# Patient Record
Sex: FEMALE | Race: WHITE | NOT HISPANIC OR LATINO | Employment: FULL TIME | URBAN - METROPOLITAN AREA
[De-identification: names, ages, dates, MRNs, and addresses within clinical notes are randomized per-mention and may not be internally consistent; named-entity substitution may affect disease eponyms.]

---

## 2019-12-11 ENCOUNTER — OFFICE VISIT (OUTPATIENT)
Dept: FAMILY MEDICINE CLINIC | Facility: CLINIC | Age: 34
End: 2019-12-11
Payer: COMMERCIAL

## 2019-12-11 ENCOUNTER — VBI (OUTPATIENT)
Dept: FAMILY MEDICINE CLINIC | Facility: CLINIC | Age: 34
End: 2019-12-11

## 2019-12-11 VITALS
RESPIRATION RATE: 14 BRPM | HEIGHT: 61 IN | WEIGHT: 192.6 LBS | DIASTOLIC BLOOD PRESSURE: 60 MMHG | TEMPERATURE: 97.6 F | BODY MASS INDEX: 36.36 KG/M2 | HEART RATE: 73 BPM | SYSTOLIC BLOOD PRESSURE: 104 MMHG | OXYGEN SATURATION: 100 %

## 2019-12-11 DIAGNOSIS — Z13.0 SCREENING FOR DEFICIENCY ANEMIA: ICD-10-CM

## 2019-12-11 DIAGNOSIS — Z13.1 SCREENING FOR DIABETES MELLITUS: ICD-10-CM

## 2019-12-11 DIAGNOSIS — Z13.220 SCREENING FOR LIPID DISORDERS: ICD-10-CM

## 2019-12-11 DIAGNOSIS — Z76.89 ENCOUNTER TO ESTABLISH CARE: ICD-10-CM

## 2019-12-11 DIAGNOSIS — Z13.29 SCREENING FOR THYROID DISORDER: ICD-10-CM

## 2019-12-11 DIAGNOSIS — L73.2 HIDRADENITIS SUPPURATIVA: Primary | ICD-10-CM

## 2019-12-11 DIAGNOSIS — Z00.00 PREVENTATIVE HEALTH CARE: ICD-10-CM

## 2019-12-11 PROCEDURE — 3008F BODY MASS INDEX DOCD: CPT | Performed by: NURSE PRACTITIONER

## 2019-12-11 PROCEDURE — 1036F TOBACCO NON-USER: CPT | Performed by: NURSE PRACTITIONER

## 2019-12-11 PROCEDURE — 99203 OFFICE O/P NEW LOW 30 MIN: CPT | Performed by: NURSE PRACTITIONER

## 2019-12-11 PROCEDURE — 36415 COLL VENOUS BLD VENIPUNCTURE: CPT | Performed by: NURSE PRACTITIONER

## 2019-12-11 RX ORDER — PRENATAL VIT 27,CALC/IRON/FA 60 MG-1 MG
1 TABLET ORAL DAILY
COMMUNITY
End: 2019-12-11 | Stop reason: ALTCHOICE

## 2019-12-11 RX ORDER — CLINDAMYCIN PHOSPHATE 11.9 MG/ML
SOLUTION TOPICAL
Refills: 2 | COMMUNITY
Start: 2019-11-06 | End: 2020-08-17 | Stop reason: SDUPTHER

## 2019-12-11 NOTE — ASSESSMENT & PLAN NOTE
Following up with dermatology and OB/GYN  Using clindamycin (CLEOCIN T) 1 % external solution, tolerating without issues

## 2019-12-11 NOTE — PROGRESS NOTES
Assessment/Plan:    1  Hidradenitis suppurativa  Assessment & Plan:  Following up with dermatology and OB/GYN  Using clindamycin (CLEOCIN T) 1 % external solution, tolerating without issues  2  Encounter to establish care    3  Preventative health care  -     CBC and differential  -     Comprehensive metabolic panel  -     TSH, 3rd generation  -     Lipid panel    4  Screening for deficiency anemia  -     CBC and differential    5  Screening for diabetes mellitus  -     Comprehensive metabolic panel    6  Screening for thyroid disorder  -     TSH, 3rd generation    7  Screening for lipid disorders  -     Lipid panel    Patient Instructions   Schedule annual physical      Return for Annual physical     Subjective:      Patient ID: Soto Chiu is a 29 y o  female  Chief Complaint   Patient presents with   Mauricio Blum is a 29year old female who presents to the office to establish care with primary provider  She is UTD PAP smear and Tdap vaccination  She has no acute complaints today  The following portions of the patient's history were reviewed and updated as appropriate: allergies, current medications, past family history, past medical history, past social history, past surgical history and problem list     Review of Systems   Respiratory: Negative for chest tightness and shortness of breath  Cardiovascular: Negative for chest pain and palpitations  Current Outpatient Medications   Medication Sig Dispense Refill    clindamycin (CLEOCIN T) 1 % external solution   2     No current facility-administered medications for this visit  Objective:    /60   Pulse 73   Temp 97 6 °F (36 4 °C) (Temporal)   Resp 14   Ht 5' 1" (1 549 m)   Wt 87 4 kg (192 lb 9 6 oz)   SpO2 100%   BMI 36 39 kg/m²        Physical Exam   Constitutional: She is oriented to person, place, and time  She appears well-developed and well-nourished  No distress     HENT:   Head: Normocephalic and atraumatic  Eyes: Conjunctivae are normal  Right eye exhibits no discharge  Left eye exhibits no discharge  Neck: Normal range of motion  Neck supple  No thyromegaly present  Cardiovascular: Normal rate, regular rhythm and normal heart sounds  Pulmonary/Chest: Effort normal and breath sounds normal  No respiratory distress  She has no decreased breath sounds  She has no wheezes  She has no rhonchi  She has no rales  Lymphadenopathy:     She has no cervical adenopathy  Neurological: She is alert and oriented to person, place, and time  Skin: Skin is warm and dry  No rash noted  She is not diaphoretic  Psychiatric: She has a normal mood and affect   Her behavior is normal  Judgment and thought content normal          MAYDA Grider

## 2019-12-12 LAB
ALBUMIN SERPL-MCNC: 4.4 G/DL (ref 3.5–5.5)
ALBUMIN/GLOB SERPL: 1.4 {RATIO} (ref 1.2–2.2)
ALP SERPL-CCNC: 90 IU/L (ref 39–117)
ALT SERPL-CCNC: 15 IU/L (ref 0–32)
AST SERPL-CCNC: 16 IU/L (ref 0–40)
BASOPHILS # BLD AUTO: 0 X10E3/UL (ref 0–0.2)
BASOPHILS NFR BLD AUTO: 1 %
BILIRUB SERPL-MCNC: <0.2 MG/DL (ref 0–1.2)
BUN SERPL-MCNC: 10 MG/DL (ref 6–20)
BUN/CREAT SERPL: 18 (ref 9–23)
CALCIUM SERPL-MCNC: 9.3 MG/DL (ref 8.7–10.2)
CHLORIDE SERPL-SCNC: 102 MMOL/L (ref 96–106)
CHOLEST SERPL-MCNC: 131 MG/DL (ref 100–199)
CHOLEST/HDLC SERPL: 4.2 RATIO (ref 0–4.4)
CO2 SERPL-SCNC: 23 MMOL/L (ref 20–29)
CREAT SERPL-MCNC: 0.56 MG/DL (ref 0.57–1)
EOSINOPHIL # BLD AUTO: 0.1 X10E3/UL (ref 0–0.4)
EOSINOPHIL NFR BLD AUTO: 2 %
ERYTHROCYTE [DISTWIDTH] IN BLOOD BY AUTOMATED COUNT: 13.8 % (ref 12.3–15.4)
GLOBULIN SER-MCNC: 3.1 G/DL (ref 1.5–4.5)
GLUCOSE SERPL-MCNC: 93 MG/DL (ref 65–99)
HCT VFR BLD AUTO: 35.7 % (ref 34–46.6)
HDLC SERPL-MCNC: 31 MG/DL
HGB BLD-MCNC: 12.1 G/DL (ref 11.1–15.9)
IMM GRANULOCYTES # BLD: 0 X10E3/UL (ref 0–0.1)
IMM GRANULOCYTES NFR BLD: 0 %
LDLC SERPL CALC-MCNC: 62 MG/DL (ref 0–99)
LYMPHOCYTES # BLD AUTO: 2.5 X10E3/UL (ref 0.7–3.1)
LYMPHOCYTES NFR BLD AUTO: 39 %
MCH RBC QN AUTO: 29 PG (ref 26.6–33)
MCHC RBC AUTO-ENTMCNC: 33.9 G/DL (ref 31.5–35.7)
MCV RBC AUTO: 86 FL (ref 79–97)
MONOCYTES # BLD AUTO: 0.4 X10E3/UL (ref 0.1–0.9)
MONOCYTES NFR BLD AUTO: 6 %
MORPHOLOGY BLD-IMP: NORMAL
NEUTROPHILS # BLD AUTO: 3.4 X10E3/UL (ref 1.4–7)
NEUTROPHILS NFR BLD AUTO: 52 %
PLATELET # BLD AUTO: 331 X10E3/UL (ref 150–450)
POTASSIUM SERPL-SCNC: 4.2 MMOL/L (ref 3.5–5.2)
PROT SERPL-MCNC: 7.5 G/DL (ref 6–8.5)
RBC # BLD AUTO: 4.17 X10E6/UL (ref 3.77–5.28)
SL AMB EGFR AFRICAN AMERICAN: 141 ML/MIN/1.73
SL AMB EGFR NON AFRICAN AMERICAN: 122 ML/MIN/1.73
SL AMB VLDL CHOLESTEROL CALC: 38 MG/DL (ref 5–40)
SODIUM SERPL-SCNC: 140 MMOL/L (ref 134–144)
TRIGL SERPL-MCNC: 190 MG/DL (ref 0–149)
TSH SERPL DL<=0.005 MIU/L-ACNC: 0.76 UIU/ML (ref 0.45–4.5)
WBC # BLD AUTO: 6.5 X10E3/UL (ref 3.4–10.8)

## 2020-01-08 ENCOUNTER — OFFICE VISIT (OUTPATIENT)
Dept: URGENT CARE | Facility: CLINIC | Age: 35
End: 2020-01-08
Payer: COMMERCIAL

## 2020-01-08 VITALS
RESPIRATION RATE: 20 BRPM | SYSTOLIC BLOOD PRESSURE: 130 MMHG | WEIGHT: 201 LBS | OXYGEN SATURATION: 100 % | DIASTOLIC BLOOD PRESSURE: 82 MMHG | BODY MASS INDEX: 39.46 KG/M2 | TEMPERATURE: 101.1 F | HEIGHT: 60 IN | HEART RATE: 111 BPM

## 2020-01-08 DIAGNOSIS — J11.1 INFLUENZA: Primary | ICD-10-CM

## 2020-01-08 PROCEDURE — 99213 OFFICE O/P EST LOW 20 MIN: CPT | Performed by: FAMILY MEDICINE

## 2020-01-08 RX ORDER — OSELTAMIVIR PHOSPHATE 75 MG/1
75 CAPSULE ORAL 2 TIMES DAILY
Qty: 10 CAPSULE | Refills: 0 | Status: SHIPPED | OUTPATIENT
Start: 2020-01-08 | End: 2020-01-13

## 2020-01-08 NOTE — LETTER
January 8, 2020     Patient: Bety Esparza   YOB: 1985   Date of Visit: 1/8/2020       To Whom it May Concern:    Bety Esparza was seen in my clinic on 1/8/2020  Please excuse from work for 01/09/2020  If you have any questions or concerns, please don't hesitate to call           Sincerely,          Doretha Roberts MD

## 2020-01-08 NOTE — PROGRESS NOTES
[facility]        NAME: Dinah Ortiz is a 29 y o  female  : 1985    MRN: [id number]  DATE: 2020  TIME: 4:50 PM    Assessment and Plan   Influenza [J11 1]  1  Influenza  oseltamivir (TAMIFLU) 75 mg capsule    Benzocaine-Menthol (CEPACOL EXTRA STRENGTH) 15-2 6 MG LOZG         Patient Instructions     Patient Instructions   1  Influenza  - Tamiflu prescribed, complete as directed  Side effects discussed, appropriate warnings given  Informed patient that if any behavioral side effects occur, instructions are to discontinue medication and call 911 immediately  - rest and drink plenty of fluids  - take Tylenol or Motrin as needed   - run a humidifier at home   - try warm salt water gargles and Cepacol throat lozenges as needed   - advised to use Flonase nasal spray  - may take OTC cold medications as needed   - if symptoms persist despite treatment, worsen, or any new symptoms present, should be seen in the ER  Follow up with PCP in 3-5 days  Proceed to  ER if symptoms worsen  Chief Complaint     Chief Complaint   Patient presents with    flu-like symptoms         History of Present Illness       30 yo female presents c/o fever, chills, headache, body aches, nasal congestion, sore throat, and dry cough x 24 hours  Symptoms were of rapid onset  No chest pain, SOB, or wheezing  Non-smoker  No GI sx  No skin rashes  She did get the flu vaccine this year  She has been taking Motrin as needed  Review of Systems   Review of Systems   Constitutional: Positive for chills and fever  HENT:        As noted in HPI   Eyes: Negative  Respiratory:        As noted in HPI   Cardiovascular: Negative  Gastrointestinal: Negative  Musculoskeletal: Positive for myalgias  As noted in HPI   Skin: Negative  Neurological: Negative            Current Medications       Current Outpatient Medications:     clindamycin (CLEOCIN T) 1 % external solution, , Disp: , Rfl: 2   Benzocaine-Menthol (CEPACOL EXTRA STRENGTH) 15-2 6 MG LOZG, Use as directed on the box, Disp: 16 lozenge, Rfl: 0    oseltamivir (TAMIFLU) 75 mg capsule, Take 1 capsule (75 mg total) by mouth 2 (two) times a day for 5 days, Disp: 10 capsule, Rfl: 0    Current Allergies     Allergies as of 2020 - Reviewed 2020   Allergen Reaction Noted    Clindamycin GI Intolerance and Nausea Only 2018            The following portions of the patient's history were reviewed and updated as appropriate: allergies, current medications, past family history, past medical history, past social history, past surgical history and problem list      Past Medical History:   Diagnosis Date    IBS (irritable bowel syndrome)        Past Surgical History:   Procedure Laterality Date    CERVICAL BIOPSY  W/ LOOP ELECTRODE EXCISION       SECTION         Family History   Problem Relation Age of Onset   [employer] Breast cancer Mother     Crohn's disease Mother     No Known Problems Father     Substance Abuse Neg Hx     Mental illness Neg Hx          Medications have been verified  Objective   /82 (BP Location: Right arm, Patient Position: Sitting, Cuff Size: Standard)   Pulse (!) 111   Temp (!) 101 1 °F (38 4 °C) (Tympanic)   Resp 20   Ht 5' (1 524 m)   Wt 91 2 kg (201 lb)   SpO2 100%   BMI 39 26 kg/m²        Physical Exam     Physical Exam   Constitutional: She is oriented to person, place, and time  She appears well-developed and well-nourished  She is cooperative  Non-toxic appearance  She appears ill  No distress  Fever of 101 1   HENT:   Head: Normocephalic and atraumatic  Right Ear: Tympanic membrane, external ear and ear canal normal    Left Ear: Tympanic membrane, external ear and ear canal normal    Nose: Mucosal edema present  Mouth/Throat: Uvula is midline and mucous membranes are normal  Posterior oropharyngeal erythema present   No oropharyngeal exudate, posterior oropharyngeal edema or tonsillar abscesses  No tonsillar exudate  Eyes: Conjunctivae and EOM are normal    Neck: Normal range of motion and full passive range of motion without pain  Neck supple  No neck rigidity  No edema, no erythema and normal range of motion present  Cardiovascular: Regular rhythm, normal heart sounds and normal pulses  Tachycardia present  Pulmonary/Chest: Effort normal and breath sounds normal  No respiratory distress  Lymphadenopathy:     She has no cervical adenopathy  Neurological: She is alert and oriented to person, place, and time  Skin: Skin is warm and dry  No rash noted  She is not diaphoretic  No pallor  Psychiatric: She has a normal mood and affect  Her behavior is normal  Judgment and thought content normal    Nursing note and vitals reviewed

## 2020-01-08 NOTE — PATIENT INSTRUCTIONS
1  Influenza  - Tamiflu prescribed, complete as directed  Side effects discussed, appropriate warnings given  Informed patient that if any behavioral side effects occur, instructions are to discontinue medication and call 911 immediately  - rest and drink plenty of fluids  - take Tylenol or Motrin as needed   - run a humidifier at home   - try warm salt water gargles and Cepacol throat lozenges as needed   - advised to use Flonase nasal spray  - may take OTC cold medications as needed   - if symptoms persist despite treatment, worsen, or any new symptoms present, should be seen in the ER

## 2020-01-10 ENCOUNTER — TELEPHONE (OUTPATIENT)
Dept: URGENT CARE | Facility: CLINIC | Age: 35
End: 2020-01-10

## 2020-01-10 NOTE — TELEPHONE ENCOUNTER
Pt called  states ear is clogged and throat still sore  Pt was advised to finish the Tamiflu, Advised pt of comfort measures,  Tylenol and Advil as needed,   salt water gargles, cool mist humidifier use  Pt to f/u with PCP if not feeling better , or worsening s/s  Provider made aware  of call

## 2020-01-13 ENCOUNTER — TELEPHONE (OUTPATIENT)
Dept: FAMILY MEDICINE CLINIC | Facility: CLINIC | Age: 35
End: 2020-01-13

## 2020-01-13 NOTE — TELEPHONE ENCOUNTER
Called to say that she was seen in the [facility] on 1/8 for the flu  She was given Tamaflu  They gave her a note stating that she was there and to excuse her from work but her work needs a note stating when she could return to work  Her work required her to be fever free for 24 hours and has to finish the tamaflu  She has been fever free since Friday and finished the Tamaflu today  She called care now to get this note and they told her that she needed to call her Primary  Per DI - it is ok to write the letter for her      Patient will

## 2020-01-13 NOTE — LETTER
January 13, 2020     Patient: Bety Esparza   YOB: 1985   Date of Visit: 1/13/2020       To Whom it May Concern:    Bety Esparza is under my professional care  She may return to work on 1/13/2020  If you have any questions or concerns, please don't hesitate to call           Sincerely,          Jolanta Hartley

## 2020-08-17 ENCOUNTER — TELEPHONE (OUTPATIENT)
Dept: FAMILY MEDICINE CLINIC | Facility: CLINIC | Age: 35
End: 2020-08-17

## 2020-08-17 ENCOUNTER — OFFICE VISIT (OUTPATIENT)
Dept: FAMILY MEDICINE CLINIC | Facility: CLINIC | Age: 35
End: 2020-08-17
Payer: COMMERCIAL

## 2020-08-17 VITALS
SYSTOLIC BLOOD PRESSURE: 120 MMHG | HEIGHT: 61 IN | DIASTOLIC BLOOD PRESSURE: 60 MMHG | HEART RATE: 78 BPM | TEMPERATURE: 98.1 F | RESPIRATION RATE: 16 BRPM | WEIGHT: 184 LBS | OXYGEN SATURATION: 98 % | BODY MASS INDEX: 34.74 KG/M2

## 2020-08-17 DIAGNOSIS — L73.2 HIDRADENITIS SUPPURATIVA: Primary | ICD-10-CM

## 2020-08-17 DIAGNOSIS — Z13.29 SCREENING FOR THYROID DISORDER: ICD-10-CM

## 2020-08-17 DIAGNOSIS — Z13.0 SCREENING FOR DEFICIENCY ANEMIA: ICD-10-CM

## 2020-08-17 DIAGNOSIS — Z11.4 SCREENING FOR HIV (HUMAN IMMUNODEFICIENCY VIRUS): ICD-10-CM

## 2020-08-17 DIAGNOSIS — Z00.00 PREVENTATIVE HEALTH CARE: Primary | ICD-10-CM

## 2020-08-17 DIAGNOSIS — Z13.1 SCREENING FOR DIABETES MELLITUS: ICD-10-CM

## 2020-08-17 DIAGNOSIS — Z13.220 SCREENING FOR LIPID DISORDERS: ICD-10-CM

## 2020-08-17 PROCEDURE — 87070 CULTURE OTHR SPECIMN AEROBIC: CPT | Performed by: NURSE PRACTITIONER

## 2020-08-17 PROCEDURE — 3725F SCREEN DEPRESSION PERFORMED: CPT | Performed by: NURSE PRACTITIONER

## 2020-08-17 PROCEDURE — 87205 SMEAR GRAM STAIN: CPT | Performed by: NURSE PRACTITIONER

## 2020-08-17 PROCEDURE — 3008F BODY MASS INDEX DOCD: CPT | Performed by: NURSE PRACTITIONER

## 2020-08-17 PROCEDURE — 99213 OFFICE O/P EST LOW 20 MIN: CPT | Performed by: NURSE PRACTITIONER

## 2020-08-17 PROCEDURE — 1036F TOBACCO NON-USER: CPT | Performed by: NURSE PRACTITIONER

## 2020-08-17 RX ORDER — CLINDAMYCIN PHOSPHATE 11.9 MG/ML
SOLUTION TOPICAL 2 TIMES DAILY
Qty: 30 ML | Refills: 2 | Status: SHIPPED | OUTPATIENT
Start: 2020-08-17 | End: 2021-03-09 | Stop reason: ALTCHOICE

## 2020-08-17 RX ORDER — DOXYCYCLINE HYCLATE 100 MG/1
CAPSULE ORAL
COMMUNITY
Start: 2020-08-06 | End: 2020-12-30 | Stop reason: ALTCHOICE

## 2020-08-17 NOTE — ASSESSMENT & PLAN NOTE
Recommend she continue doxycycline as directed by GYN as she is seeing improvement in symptoms  Recommend she start contraception to help improve symptoms as also recommended by GYN  Pt verbalized understanding

## 2020-08-17 NOTE — PROGRESS NOTES
Assessment/Plan:    1  Hidradenitis suppurativa  Assessment & Plan:  Recommend she continue doxycycline as directed by GYN as she is seeing improvement in symptoms  Recommend she start contraception to help improve symptoms as also recommended by GYN  Pt verbalized understanding  Orders:  -     clindamycin (CLEOCIN T) 1 % external solution; Apply topically 2 (two) times a day  -     Wound culture and Gram stain    BMI Counseling: Body mass index is 34 77 kg/m²  The BMI is above normal  Nutrition recommendations include decreasing portion sizes, encouraging healthy choices of fruits and vegetables, decreasing fast food intake, consuming healthier snacks, limiting drinks that contain sugar, moderation in carbohydrate intake, increasing intake of lean protein, reducing intake of saturated and trans fat and reducing intake of cholesterol  Exercise recommendations include moderate physical activity 150 minutes/week and exercising 3-5 times per week  Patient Instructions   Return for annual physical       Return for Annual physical     Subjective:      Patient ID: Eboni Ruiz is a 28 y o  female  Chief Complaint   Patient presents with   Fengdalia Mendez is a 28year old female who presents to the office for evaluation and discussion of cysts  Pt reports she was at the urgent care for a very large cyst on her inner right thigh that became painful  States the provider at the urgent care told her to rule out pilonidal cyst formation r/t "odor"  Pt reports she has been on doxycycline from GYN for hidradenitis suppurativa and it has been working well for her, for the most part  States she has also been losing weight, eating healthy to help with her symptoms         The following portions of the patient's history were reviewed and updated as appropriate: allergies, current medications, past family history, past medical history, past social history, past surgical history and problem list     Review of Systems   Constitutional: Negative for fever  Skin:        Mx recurrent cysts that drain - under BL breasts, arms and thighs         Current Outpatient Medications   Medication Sig Dispense Refill    clindamycin (CLEOCIN T) 1 % external solution Apply topically 2 (two) times a day 30 mL 2    doxycycline hyclate (VIBRAMYCIN) 100 mg capsule        No current facility-administered medications for this visit  Objective:    /60   Pulse 78   Temp 98 1 °F (36 7 °C) (Temporal)   Resp 16   Ht 5' 1" (1 549 m)   Wt 83 5 kg (184 lb)   SpO2 98%   BMI 34 77 kg/m²        Physical Exam  Constitutional:       General: She is not in acute distress  Appearance: She is not ill-appearing  HENT:      Head: Normocephalic and atraumatic  Skin:     Findings: Acne and rash present  Rash is pustular  Comments: Right, lower breast has indurated papule with dark discoloration  Right groin has white head - drained with moderate amount of malodorous, green/yellow drainage   Neurological:      Mental Status: She is alert and oriented to person, place, and time  Psychiatric:         Mood and Affect: Mood normal          Behavior: Behavior normal          Thought Content:  Thought content normal          Judgment: Judgment normal            MAYDA Camacho

## 2020-08-20 LAB
BACTERIA WND AEROBE CULT: ABNORMAL
GRAM STN SPEC: ABNORMAL
GRAM STN SPEC: ABNORMAL

## 2020-08-31 ENCOUNTER — OFFICE VISIT (OUTPATIENT)
Dept: URGENT CARE | Facility: CLINIC | Age: 35
End: 2020-08-31
Payer: COMMERCIAL

## 2020-08-31 VITALS
SYSTOLIC BLOOD PRESSURE: 137 MMHG | DIASTOLIC BLOOD PRESSURE: 86 MMHG | WEIGHT: 182.2 LBS | HEART RATE: 87 BPM | HEIGHT: 60 IN | TEMPERATURE: 98.8 F | OXYGEN SATURATION: 99 % | BODY MASS INDEX: 35.77 KG/M2 | RESPIRATION RATE: 18 BRPM

## 2020-08-31 DIAGNOSIS — J06.9 ACUTE URI: Primary | ICD-10-CM

## 2020-08-31 PROCEDURE — 99213 OFFICE O/P EST LOW 20 MIN: CPT | Performed by: FAMILY MEDICINE

## 2020-08-31 PROCEDURE — 3008F BODY MASS INDEX DOCD: CPT | Performed by: NURSE PRACTITIONER

## 2020-08-31 PROCEDURE — 3008F BODY MASS INDEX DOCD: CPT | Performed by: FAMILY MEDICINE

## 2020-08-31 PROCEDURE — 1036F TOBACCO NON-USER: CPT | Performed by: FAMILY MEDICINE

## 2020-08-31 RX ORDER — FLUTICASONE PROPIONATE 50 MCG
1 SPRAY, SUSPENSION (ML) NASAL DAILY
Qty: 1 BOTTLE | Refills: 0 | Status: SHIPPED | OUTPATIENT
Start: 2020-08-31 | End: 2021-03-09 | Stop reason: ALTCHOICE

## 2020-08-31 NOTE — PATIENT INSTRUCTIONS
Acute viral URI (common cold)  - rest and drink plenty of fluids  - take Tylenol or Motrin as needed   - advised warm salt water gargles and throat lozenges as needed   - drink warm tea w/ lemon and honey   - run a humidifier at home   - advised using Flonase nasal spray  - if symptoms persist despite treatment, worsen, or any new symptoms present, should be seen by PCP for re-check

## 2020-08-31 NOTE — PROGRESS NOTES
[facility]        NAME: Sima Goldsmith is a 28 y o  female  : 1985    MRN: [id number]  DATE: 2020  TIME: 9:56 AM    Assessment and Plan   Acute URI [J06 9]  1  Acute URI  fluticasone (FLONASE) 50 mcg/act nasal spray         Patient Instructions     Patient Instructions   Acute viral URI (common cold)  - rest and drink plenty of fluids  - take Tylenol or Motrin as needed   - advised warm salt water gargles and throat lozenges as needed   - drink warm tea w/ lemon and honey   - run a humidifier at home   - advised using Flonase nasal spray  - if symptoms persist despite treatment, worsen, or any new symptoms present, should be seen by PCP for re-check  Follow up with PCP in 3-5 days  Proceed to  ER if symptoms worsen  Chief Complaint     Chief Complaint   Patient presents with    Cold Like Symptoms         History of Present Illness       27 yo female presents c/o sinus pressure, nasal congestion, and post-nasal drip x 2-3 days  No fever/chills  No headache or body aches  No cough or wheezing  No chest pain or SOB  No GI sx  No skin rashes  She has not attempted any treatment for the symptoms  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 2 days ago, but results are currently pending  She denies any known contact with anyone who may have been positive for COVID-19  She has recently traveled to [facility], no other travel, domestic, or international        Review of Systems   Review of Systems   Constitutional: Negative  HENT:        As noted in HPI   Eyes: Negative  Respiratory: Negative  Cardiovascular: Negative  Gastrointestinal: Negative  Musculoskeletal: Negative  Skin: Negative  Neurological: Negative  Hematological: Negative            Current Medications       Current Outpatient Medications:     clindamycin (CLEOCIN T) 1 % external solution, Apply topically 2 (two) times a day, Disp: 30 mL, Rfl: 2    doxycycline hyclate (VIBRAMYCIN) 100 mg capsule, , Disp: , Rfl:     fluticasone (FLONASE) 50 mcg/act nasal spray, 1 spray into each nostril daily, Disp: 1 Bottle, Rfl: 0    Current Allergies     Allergies as of 2020 - Reviewed 2020   Allergen Reaction Noted    Clindamycin GI Intolerance and Nausea Only 2018            The following portions of the patient's history were reviewed and updated as appropriate: allergies, current medications, past family history, past medical history, past social history, past surgical history and problem list      Past Medical History:   Diagnosis Date    Allergic     IBS (irritable bowel syndrome)        Past Surgical History:   Procedure Laterality Date    CERVICAL BIOPSY  W/ LOOP ELECTRODE EXCISION       SECTION         Family History   Problem Relation Age of Onset   [employer] Breast cancer Mother     Crohn's disease Mother     No Known Problems Father     Substance Abuse Neg Hx     Mental illness Neg Hx          Medications have been verified  Objective   /86   Pulse 87   Temp 98 8 °F (37 1 °C)   Resp 18   Ht 5' (1 524 m)   Wt 82 6 kg (182 lb 3 2 oz)   LMP 2020   SpO2 99%   Breastfeeding No   BMI 35 58 kg/m²        Physical Exam     Physical Exam  Vitals signs and nursing note reviewed  Constitutional:       General: She is awake  She is not in acute distress  Appearance: Normal appearance  She is well-developed, well-groomed and overweight  She is not ill-appearing, toxic-appearing or diaphoretic  HENT:      Head: Normocephalic and atraumatic  Right Ear: Tympanic membrane, ear canal and external ear normal       Left Ear: Tympanic membrane, ear canal and external ear normal       Nose: Mucosal edema and rhinorrhea present  Rhinorrhea is clear  Right Sinus: Maxillary sinus tenderness present  No frontal sinus tenderness  Left Sinus: Maxillary sinus tenderness present  No frontal sinus tenderness        Mouth/Throat: Lips: Pink  Mouth: Mucous membranes are moist       Pharynx: Oropharynx is clear  Uvula midline  Tonsils: No tonsillar exudate or tonsillar abscesses  Eyes:      General: Lids are normal       Extraocular Movements: Extraocular movements intact  Conjunctiva/sclera: Conjunctivae normal    Neck:      Musculoskeletal: Normal range of motion and neck supple  No neck rigidity or muscular tenderness  Cardiovascular:      Rate and Rhythm: Normal rate and regular rhythm  Pulses: Normal pulses  Heart sounds: Normal heart sounds  Pulmonary:      Effort: Pulmonary effort is normal  No tachypnea, accessory muscle usage or respiratory distress  Breath sounds: Normal breath sounds and air entry  Lymphadenopathy:      Cervical: No cervical adenopathy  Skin:     General: Skin is warm and dry  Coloration: Skin is not pale  Neurological:      Mental Status: She is alert and oriented to person, place, and time  Mental status is at baseline  Psychiatric:         Mood and Affect: Mood normal          Behavior: Behavior normal  Behavior is cooperative  Thought Content:  Thought content normal          Judgment: Judgment normal

## 2020-09-01 ENCOUNTER — TELEPHONE (OUTPATIENT)
Dept: URGENT CARE | Facility: CLINIC | Age: 35
End: 2020-09-01

## 2020-09-01 NOTE — TELEPHONE ENCOUNTER
Patient was seen in our office on 08/31 for an acute viral URI  She was advised supportive treatment and prescribed Flonase nasal spray  She states her ear feels clogged this morning and is concerned she may need a steroid or antibiotic  I have explained to the patient that based on my exam of her yesterday, there were no indications for treatment with a steroid or antibiotic  I have explained that upper respiratory symptoms can cause the ear to feel clogged due to eustachian tube dysfunction  I have advised to continue treatment with Flonase nasal spray and supportive measures as discussed yesterday  If symptoms persist despite treatment, worsen, or any new symptoms present, she is to follow up w/ her PCP for a re-check  Patient verbalizes understanding and agrees w/ the plan

## 2020-09-28 ENCOUNTER — TELEPHONE (OUTPATIENT)
Dept: FAMILY MEDICINE CLINIC | Facility: CLINIC | Age: 35
End: 2020-09-28

## 2020-09-28 DIAGNOSIS — M54.9 ACUTE BACK PAIN, UNSPECIFIED BACK LOCATION, UNSPECIFIED BACK PAIN LATERALITY: Primary | ICD-10-CM

## 2020-09-28 NOTE — TELEPHONE ENCOUNTER
Elena Nancy states she has been having back pain (which she thinks is from having a large chest ) but she isnt really sure  She works at [employer] and states one of the therapists told her if she gets a PT order she can go there and it will be covered    Please put in order for PT and call when ready  [phone]

## 2020-12-09 ENCOUNTER — TELEPHONE (OUTPATIENT)
Dept: FAMILY MEDICINE CLINIC | Facility: CLINIC | Age: 35
End: 2020-12-09

## 2020-12-09 DIAGNOSIS — B37.3 YEAST VAGINITIS: Primary | ICD-10-CM

## 2020-12-09 RX ORDER — FLUCONAZOLE 150 MG/1
150 TABLET ORAL ONCE
Qty: 1 TABLET | Refills: 1 | Status: SHIPPED | OUTPATIENT
Start: 2020-12-09 | End: 2020-12-10 | Stop reason: SDUPTHER

## 2020-12-10 RX ORDER — FLUCONAZOLE 150 MG/1
TABLET ORAL
Qty: 3 TABLET | Refills: 0 | Status: SHIPPED | OUTPATIENT
Start: 2020-12-10 | End: 2020-12-14

## 2020-12-29 ENCOUNTER — TELEPHONE (OUTPATIENT)
Dept: FAMILY MEDICINE CLINIC | Facility: CLINIC | Age: 35
End: 2020-12-29

## 2020-12-30 ENCOUNTER — TELEMEDICINE (OUTPATIENT)
Dept: FAMILY MEDICINE CLINIC | Facility: CLINIC | Age: 35
End: 2020-12-30
Payer: COMMERCIAL

## 2020-12-30 VITALS — WEIGHT: 172 LBS | HEIGHT: 60 IN | BODY MASS INDEX: 33.77 KG/M2 | TEMPERATURE: 96.7 F

## 2020-12-30 DIAGNOSIS — Z13.29 SCREENING FOR THYROID DISORDER: ICD-10-CM

## 2020-12-30 DIAGNOSIS — Z13.0 SCREENING FOR DEFICIENCY ANEMIA: ICD-10-CM

## 2020-12-30 DIAGNOSIS — Z00.00 PREVENTATIVE HEALTH CARE: ICD-10-CM

## 2020-12-30 DIAGNOSIS — Z13.1 SCREENING FOR DIABETES MELLITUS: ICD-10-CM

## 2020-12-30 DIAGNOSIS — R25.2 CRAMPING OF HANDS: Primary | ICD-10-CM

## 2020-12-30 DIAGNOSIS — Z13.220 SCREENING FOR LIPID DISORDERS: ICD-10-CM

## 2020-12-30 PROBLEM — J06.9 ACUTE URI: Status: RESOLVED | Noted: 2020-08-31 | Resolved: 2020-12-30

## 2020-12-30 PROCEDURE — 99213 OFFICE O/P EST LOW 20 MIN: CPT | Performed by: NURSE PRACTITIONER

## 2021-03-03 ENCOUNTER — OFFICE VISIT (OUTPATIENT)
Dept: FAMILY MEDICINE CLINIC | Facility: CLINIC | Age: 36
End: 2021-03-03
Payer: COMMERCIAL

## 2021-03-03 VITALS
HEART RATE: 90 BPM | DIASTOLIC BLOOD PRESSURE: 88 MMHG | BODY MASS INDEX: 35.5 KG/M2 | OXYGEN SATURATION: 99 % | RESPIRATION RATE: 16 BRPM | HEIGHT: 61 IN | WEIGHT: 188 LBS | SYSTOLIC BLOOD PRESSURE: 120 MMHG | TEMPERATURE: 98.5 F

## 2021-03-03 DIAGNOSIS — L02.91 ABSCESS: Primary | ICD-10-CM

## 2021-03-03 DIAGNOSIS — L73.2 HIDRADENITIS SUPPURATIVA: ICD-10-CM

## 2021-03-03 DIAGNOSIS — B36.9 FUNGAL DERMATITIS: ICD-10-CM

## 2021-03-03 DIAGNOSIS — Z3A.09 9 WEEKS GESTATION OF PREGNANCY: ICD-10-CM

## 2021-03-03 PROCEDURE — 3008F BODY MASS INDEX DOCD: CPT | Performed by: NURSE PRACTITIONER

## 2021-03-03 PROCEDURE — 99214 OFFICE O/P EST MOD 30 MIN: CPT | Performed by: NURSE PRACTITIONER

## 2021-03-03 PROCEDURE — 3725F SCREEN DEPRESSION PERFORMED: CPT | Performed by: NURSE PRACTITIONER

## 2021-03-03 PROCEDURE — 10160 PNXR ASPIR ABSC HMTMA BULLA: CPT | Performed by: NURSE PRACTITIONER

## 2021-03-03 RX ORDER — NYSTATIN 100000 U/G
CREAM TOPICAL 2 TIMES DAILY
Qty: 30 G | Refills: 0 | Status: SHIPPED | OUTPATIENT
Start: 2021-03-03 | End: 2022-04-14 | Stop reason: HOSPADM

## 2021-03-03 RX ORDER — DOXYLAMINE SUCCINATE AND PYRIDOXINE HYDROCHLORIDE, DELAYED RELEASE TABLETS 10 MG/10 MG 10; 10 MG/1; MG/1
TABLET, DELAYED RELEASE ORAL
COMMUNITY
Start: 2021-02-18 | End: 2022-04-14 | Stop reason: HOSPADM

## 2021-03-03 RX ORDER — AMOXICILLIN AND CLAVULANATE POTASSIUM 500; 125 MG/1; MG/1
1 TABLET, FILM COATED ORAL EVERY 12 HOURS SCHEDULED
Qty: 14 TABLET | Refills: 0 | Status: SHIPPED | OUTPATIENT
Start: 2021-03-03 | End: 2021-03-10

## 2021-03-03 NOTE — PATIENT INSTRUCTIONS
Warm compresses to the area  Wash gently with soap and water  Keep covered if you continue to have drainage  Return to the office for recheck in 4-5 days

## 2021-03-03 NOTE — PROGRESS NOTES
Incision and Drainage    Date/Time: 3/3/2021 2:48 PM  Performed by: Eldon Bullock  Authorized by: MAYDA Walls   Universal Protocol:  Consent: Verbal consent obtained  Consent given by: patient  Patient understanding: patient states understanding of the procedure being performed  Patient identity confirmed: verbally with patient      Location:     Type:  Abscess    Size:  3 cm    Location:  Anogenital    Anogenital location:  Perineum  Pre-procedure details:     Skin preparation:  Betadine  Anesthesia (see MAR for exact dosages): Anesthesia method:  Local infiltration    Local anesthetic:  Lidocaine 2% w/o epi  Procedure details:     Complexity:  Intermediate    Incision types:  Stab incision    Aspiration type: puncture aspiration      Scalpel blade:  11    Approach:  Puncture    Incision depth:  Subcutaneous    Wound management:  Irrigated with saline and probed and deloculated    Drainage:  Purulent    Drainage amount:  Copious    Wound treatment:  Wound left open    Packing materials:  None  Post-procedure details:     Patient tolerance of procedure:   Tolerated well, no immediate complications      MAYDA Walls

## 2021-03-03 NOTE — PROGRESS NOTES
Assessment/Plan:    1  Abscess  -     amoxicillin-clavulanate (AUGMENTIN) 500-125 mg per tablet; Take 1 tablet by mouth every 12 (twelve) hours for 7 days    2  Hidradenitis suppurativa  -     amoxicillin-clavulanate (AUGMENTIN) 500-125 mg per tablet; Take 1 tablet by mouth every 12 (twelve) hours for 7 days    3  Fungal dermatitis  -     miconazole (MICOTIN) 2 % powder; Apply topically 2 (two) times a day  -     nystatin (MYCOSTATIN) cream; Apply topically 2 (two) times a day    4  9 weeks gestation of pregnancy            Patient Instructions   Warm compresses to the area  Wash gently with soap and water  Keep covered if you continue to have drainage  Return to the office for recheck in 4-5 days      Return in about 5 days (around 3/8/2021) for [address]  Subjective:      Patient ID: Rayray Cui is a 39 y o  female  Chief Complaint   Patient presents with    cyst on inner thigh    rash under right breast       Radha Chauhan is a 39year old female with hidradenitis suppurativa who is 9 weeks pregnant who presents to the office for evaluation of cyst-like swelling on her right inner thigh  Reports she has recurrent boils in the same area, also under her breasts and in both inner thigh/groin areas  Reports since her pregnancy, her symptoms have become worse  She had been previously using doxycycline for management of outbreaks, but reports she stopped r/t pregnancy  She reports she also has a rash under her left breast that is red and slightly tender  The following portions of the patient's history were reviewed and updated as appropriate: allergies, current medications, past family history, past medical history, past social history, past surgical history and problem list     Review of Systems   Constitutional: Negative for chills and fever  Skin: Negative for rash and wound          Cyst-like lump in left inner thigh, tender         Current Outpatient Medications   Medication Sig Dispense Refill    Prenatal Multivit-Min-Fe-FA (PRENATAL/IRON PO) Take by mouth      amoxicillin-clavulanate (AUGMENTIN) 500-125 mg per tablet Take 1 tablet by mouth every 12 (twelve) hours for 7 days 14 tablet 0    clindamycin (CLEOCIN T) 1 % external solution Apply topically 2 (two) times a day (Patient not taking: Reported on 3/3/2021) 30 mL 2    Doxylamine-Pyridoxine 10-10 MG TBEC       fluticasone (FLONASE) 50 mcg/act nasal spray 1 spray into each nostril daily (Patient not taking: Reported on 3/3/2021) 1 Bottle 0    miconazole (MICOTIN) 2 % powder Apply topically 2 (two) times a day 70 g 0    Multiple Vitamins-Minerals (MULTIVITAMIN GUMMIES ADULT PO) Take 2 each by mouth daily      nystatin (MYCOSTATIN) cream Apply topically 2 (two) times a day 30 g 0     No current facility-administered medications for this visit  Objective:    /88   Pulse 90   Temp 98 5 °F (36 9 °C) (Temporal)   Resp 16   Ht 5' 1" (1 549 m)   Wt 85 3 kg (188 lb)   LMP 08/03/2020   SpO2 99%   Breastfeeding No   BMI 35 52 kg/m²        Physical Exam  Vitals signs reviewed  Constitutional:       Appearance: Normal appearance  HENT:      Head: Normocephalic and atraumatic  Cardiovascular:      Rate and Rhythm: Normal rate and regular rhythm  Pulses: Normal pulses  Heart sounds: Normal heart sounds  Pulmonary:      Effort: Pulmonary effort is normal       Breath sounds: Normal breath sounds  Chest:      Comments: Erythema/excoriation noted under left breast   Genitourinary:      Neurological:      Mental Status: She is alert and oriented to person, place, and time     Psychiatric:         Mood and Affect: Mood normal                 MAYDA Hurley

## 2021-03-09 ENCOUNTER — TELEMEDICINE (OUTPATIENT)
Dept: FAMILY MEDICINE CLINIC | Facility: CLINIC | Age: 36
End: 2021-03-09
Payer: COMMERCIAL

## 2021-03-09 VITALS — TEMPERATURE: 97.8 F

## 2021-03-09 DIAGNOSIS — L73.2 HIDRADENITIS SUPPURATIVA: ICD-10-CM

## 2021-03-09 DIAGNOSIS — L02.91 ABSCESS: Primary | ICD-10-CM

## 2021-03-09 DIAGNOSIS — B36.9 FUNGAL DERMATITIS: ICD-10-CM

## 2021-03-09 PROCEDURE — 1036F TOBACCO NON-USER: CPT | Performed by: NURSE PRACTITIONER

## 2021-03-09 PROCEDURE — 99024 POSTOP FOLLOW-UP VISIT: CPT | Performed by: NURSE PRACTITIONER

## 2021-03-09 NOTE — PROGRESS NOTES
Virtual Regular Visit      Assessment/Plan:    Problem List Items Addressed This Visit        Musculoskeletal and Integument    Hidradenitis suppurativa      Other Visit Diagnoses     Abscess    -  Primary    Improvement with augmentin  Advise warm compresses and finish entire course of antibiotic  RTO if symptoms persist or worsen  Fungal dermatitis        Rash currently resolved  Use powder and/or cream PRN only  Reason for visit is   Chief Complaint   Patient presents with    leg  cyst     morning sickness difficult, and worse with the ABX, tring her best, overall getting better    Virtual Regular Visit        Encounter provider MAYDA Zuniga    Provider located at [address] [place] [id number]      Recent Visits  Date Type Provider Dept   03/03/21 Office Visit Nik Ching, [facility]   Showing recent visits within past 7 days and meeting all other requirements     Today's Visits  Date Type Provider Dept   03/09/21 [facility], [facility]   Showing today's visits and meeting all other requirements     Future Appointments  No visits were found meeting these conditions  Showing future appointments within next 150 days and meeting all other requirements        The patient was identified by name and date of birth  Celso Burns was informed that this is a telemedicine visit and that the visit is being conducted through [facility] and patient was informed that this is a secure, HIPAA-compliant platform  She agrees to proceed     My office door was closed  No one else was in the room  She acknowledged consent and understanding of privacy and security of the video platform  The patient has agreed to participate and understands they can discontinue the visit at any time  Patient is aware this is a billable service       Malvina Sandhoff is a 39year old female who is approx 10 weeks gestation, who is scheduled for a virtual recheck of cyst of right inner thigh  Reports the cysts has improved greatly with use of augmentin  She does report some stomach upset, nausea and diarrhea with use  Denies fever or chills  Reports the rash under her right breast is resolved with use miconazole powder  Past Medical History:   Diagnosis Date    Allergic     IBS (irritable bowel syndrome)        Past Surgical History:   Procedure Laterality Date    CERVICAL BIOPSY  W/ LOOP ELECTRODE EXCISION       SECTION         Current Outpatient Medications   Medication Sig Dispense Refill    amoxicillin-clavulanate (AUGMENTIN) 500-125 mg per tablet Take 1 tablet by mouth every 12 (twelve) hours for 7 days 14 tablet 0    Doxylamine-Pyridoxine 10-10 MG TBEC       miconazole (MICOTIN) 2 % powder Apply topically 2 (two) times a day 70 g 0    Prenatal Multivit-Min-Fe-FA (PRENATAL/IRON PO) Take by mouth      nystatin (MYCOSTATIN) cream Apply topically 2 (two) times a day (Patient not taking: Reported on 3/9/2021) 30 g 0     No current facility-administered medications for this visit  Allergies   Allergen Reactions    Clindamycin Nausea Only and GI Intolerance       Review of Systems   Constitutional: Negative for chills and fever  Gastrointestinal: Positive for diarrhea and nausea  Negative for vomiting  Skin: Negative for rash  Cyst, improving, right inner thigh       Video Exam    Vitals:    21 0832   Temp: 97 8 °F (36 6 °C)   TempSrc: Temporal       Physical Exam  Vitals signs reviewed  Constitutional:       Appearance: Normal appearance  HENT:      Head: Normocephalic and atraumatic  Neurological:      Mental Status: She is alert and oriented to person, place, and time     Psychiatric:         Mood and Affect: Mood normal           I spent 15 minutes directly with the patient during this visit      VIRTUAL VISIT DISCLAIMER    Shant Richardson acknowledges that she has consented to an online visit or consultation  She understands that the online visit is based solely on information provided by her, and that, in the absence of a face-to-face physical evaluation by the physician, the diagnosis she receives is both limited and provisional in terms of accuracy and completeness  This is not intended to replace a full medical face-to-face evaluation by the physician  Shant Richardson understands and accepts these terms

## 2021-04-13 ENCOUNTER — OFFICE VISIT (OUTPATIENT)
Dept: URGENT CARE | Facility: CLINIC | Age: 36
End: 2021-04-13
Payer: COMMERCIAL

## 2021-04-13 VITALS
DIASTOLIC BLOOD PRESSURE: 68 MMHG | TEMPERATURE: 97.8 F | OXYGEN SATURATION: 100 % | SYSTOLIC BLOOD PRESSURE: 122 MMHG | HEIGHT: 60 IN | WEIGHT: 190 LBS | RESPIRATION RATE: 16 BRPM | HEART RATE: 100 BPM | BODY MASS INDEX: 37.3 KG/M2

## 2021-04-13 DIAGNOSIS — J02.9 ACUTE PHARYNGITIS, UNSPECIFIED ETIOLOGY: Primary | ICD-10-CM

## 2021-04-13 LAB — S PYO AG THROAT QL: NEGATIVE

## 2021-04-13 PROCEDURE — 99213 OFFICE O/P EST LOW 20 MIN: CPT | Performed by: FAMILY MEDICINE

## 2021-04-13 PROCEDURE — 87880 STREP A ASSAY W/OPTIC: CPT | Performed by: FAMILY MEDICINE

## 2021-04-13 PROCEDURE — 87070 CULTURE OTHR SPECIMN AEROBIC: CPT | Performed by: FAMILY MEDICINE

## 2021-04-13 NOTE — PATIENT INSTRUCTIONS
1  Acute Pharyngitis  - rapid strep test is negative, throat swab sent for culture testing, patient is to call the office in 2-3 days to follow up test results, if positive, a pregnancy safe antibiotic will be prescribed  - patient has been instructed to rest, drink plenty of fluids, take Tylenol as needed, and may try warm salt water gargles  - if symptoms persist despite treatment, worsen, or any new symptoms present, patient is to follow up w/ PCP office for re-check

## 2021-04-13 NOTE — PROGRESS NOTES
[facility]        NAME: Eboni Ruiz is a 39 y o  female  : 1985    MRN: [id number]  DATE: 2021  TIME: 9:37 AM    Assessment and Plan   Acute pharyngitis, unspecified etiology [J02 9]  1  Acute pharyngitis, unspecified etiology  POCT rapid strepA    Throat culture         Patient Instructions     Patient Instructions   1  Acute Pharyngitis  - rapid strep test is negative, throat swab sent for culture testing, patient is to call the office in 2-3 days to follow up test results, if positive, a pregnancy safe antibiotic will be prescribed  - patient has been instructed to rest, drink plenty of fluids, take Tylenol as needed, and may try warm salt water gargles  - if symptoms persist despite treatment, worsen, or any new symptoms present, patient is to follow up w/ PCP office for re-check  Follow up with PCP in 3-5 days  Proceed to  ER if symptoms worsen  Chief Complaint     Chief Complaint   Patient presents with    Sore Throat         History of Present Illness       38 yo female presents c/o sore throat x 1 day  No other cold/URI symptoms  No fever/chills  No headache or body aches  No chest pain or SOB  No acute GI symptoms  No skin rashes  No loss of taste or smell  No recent travel or known exposure to anyone with COVID-19  She states she was tested for COVID-19 at work yesterday and is negative  Her  was diagnosed with strep throat 2 weeks ago and treated  Patient is currently 15 weeks pregnant, she denies any current complaints or concerns in regards to her pregnancy  Review of Systems   Review of Systems   Constitutional: Negative  HENT:        As noted in HPI   Eyes: Negative  Respiratory: Negative  Cardiovascular: Negative  Gastrointestinal: Negative  Genitourinary: Negative  Musculoskeletal: Negative  Skin: Negative  Neurological: Negative  Hematological: Negative            Current Medications       Current Outpatient Medications:     Prenatal Multivit-Min-Fe-FA (PRENATAL/IRON PO), Take by mouth, Disp: , Rfl:     Doxylamine-Pyridoxine 10-10 MG TBEC, , Disp: , Rfl:     miconazole (MICOTIN) 2 % powder, Apply topically 2 (two) times a day (Patient not taking: Reported on 2021), Disp: 70 g, Rfl: 0    nystatin (MYCOSTATIN) cream, Apply topically 2 (two) times a day (Patient not taking: Reported on 3/9/2021), Disp: 30 g, Rfl: 0    Current Allergies     Allergies as of 2021 - Reviewed 2021   Allergen Reaction Noted    Clindamycin Nausea Only and GI Intolerance 2018            The following portions of the patient's history were reviewed and updated as appropriate: allergies, current medications, past family history, past medical history, past social history, past surgical history and problem list      Past Medical History:   Diagnosis Date    Allergic     IBS (irritable bowel syndrome)        Past Surgical History:   Procedure Laterality Date    CERVICAL BIOPSY  W/ LOOP ELECTRODE EXCISION       SECTION         Family History   Problem Relation Age of Onset   Julien Baker Breast cancer Mother     Crohn's disease Mother     No Known Problems Father     Substance Abuse Neg Hx     Mental illness Neg Hx          Medications have been verified  Objective   /68 (BP Location: Right arm, Patient Position: Sitting, Cuff Size: Standard)   Pulse 100   Temp 97 8 °F (36 6 °C) (Tympanic)   Resp 16   Ht 5' (1 524 m)   Wt 86 2 kg (190 lb)   LMP 2020   SpO2 100%   BMI 37 11 kg/m²   Patient's last menstrual period was 2020  Physical Exam     Physical Exam  Vitals signs and nursing note reviewed  Constitutional:       General: She is awake  She is not in acute distress  Appearance: Normal appearance  She is well-developed and well-groomed  She is not ill-appearing, toxic-appearing or diaphoretic  HENT:      Head: Normocephalic and atraumatic        Nose: Nose normal  Mouth/Throat:      Lips: Pink  Mouth: Mucous membranes are moist       Pharynx: Oropharynx is clear  Uvula midline  No pharyngeal swelling, oropharyngeal exudate, posterior oropharyngeal erythema or uvula swelling  Tonsils: No tonsillar exudate or tonsillar abscesses  Neck:      Musculoskeletal: Normal range of motion and neck supple  No neck rigidity or muscular tenderness  Cardiovascular:      Rate and Rhythm: Normal rate  Pulses: Normal pulses  Pulmonary:      Effort: Pulmonary effort is normal  No tachypnea, accessory muscle usage or respiratory distress  Lymphadenopathy:      Cervical: No cervical adenopathy  Skin:     General: Skin is warm and dry  Coloration: Skin is not pale  Neurological:      Mental Status: She is alert and oriented to person, place, and time  Mental status is at baseline  Psychiatric:         Attention and Perception: Attention and perception normal          Mood and Affect: Mood and affect normal          Speech: Speech normal          Behavior: Behavior normal  Behavior is cooperative  Thought Content:  Thought content normal          Cognition and Memory: Cognition and memory normal          Judgment: Judgment normal

## 2021-04-14 ENCOUNTER — OFFICE VISIT (OUTPATIENT)
Dept: FAMILY MEDICINE CLINIC | Facility: CLINIC | Age: 36
End: 2021-04-14
Payer: COMMERCIAL

## 2021-04-14 ENCOUNTER — TELEPHONE (OUTPATIENT)
Dept: FAMILY MEDICINE CLINIC | Facility: CLINIC | Age: 36
End: 2021-04-14

## 2021-04-14 VITALS
OXYGEN SATURATION: 98 % | HEART RATE: 88 BPM | WEIGHT: 196 LBS | RESPIRATION RATE: 16 BRPM | HEIGHT: 60 IN | DIASTOLIC BLOOD PRESSURE: 82 MMHG | TEMPERATURE: 98 F | SYSTOLIC BLOOD PRESSURE: 128 MMHG | BODY MASS INDEX: 38.48 KG/M2

## 2021-04-14 DIAGNOSIS — J02.9 SORE THROAT: ICD-10-CM

## 2021-04-14 DIAGNOSIS — R50.9 FEVER, UNSPECIFIED FEVER CAUSE: Primary | ICD-10-CM

## 2021-04-14 DIAGNOSIS — R50.9 FEVER, UNSPECIFIED FEVER CAUSE: ICD-10-CM

## 2021-04-14 DIAGNOSIS — J02.9 ACUTE PHARYNGITIS, UNSPECIFIED ETIOLOGY: Primary | ICD-10-CM

## 2021-04-14 LAB — S PYO AG THROAT QL: NEGATIVE

## 2021-04-14 PROCEDURE — 1036F TOBACCO NON-USER: CPT | Performed by: NURSE PRACTITIONER

## 2021-04-14 PROCEDURE — 99213 OFFICE O/P EST LOW 20 MIN: CPT | Performed by: NURSE PRACTITIONER

## 2021-04-14 PROCEDURE — U0005 INFEC AGEN DETEC AMPLI PROBE: HCPCS | Performed by: NURSE PRACTITIONER

## 2021-04-14 PROCEDURE — 87880 STREP A ASSAY W/OPTIC: CPT | Performed by: NURSE PRACTITIONER

## 2021-04-14 PROCEDURE — U0003 INFECTIOUS AGENT DETECTION BY NUCLEIC ACID (DNA OR RNA); SEVERE ACUTE RESPIRATORY SYNDROME CORONAVIRUS 2 (SARS-COV-2) (CORONAVIRUS DISEASE [COVID-19]), AMPLIFIED PROBE TECHNIQUE, MAKING USE OF HIGH THROUGHPUT TECHNOLOGIES AS DESCRIBED BY CMS-2020-01-R: HCPCS | Performed by: NURSE PRACTITIONER

## 2021-04-14 PROCEDURE — 3008F BODY MASS INDEX DOCD: CPT | Performed by: NURSE PRACTITIONER

## 2021-04-14 NOTE — TELEPHONE ENCOUNTER
Wanted to let you know that when she got home today she felt warm  She stated that her temp 99 8 to 100  She is concerned due to be pregnant and wanted you to know about it      Please advise

## 2021-04-14 NOTE — TELEPHONE ENCOUNTER
She can take tylenol for the fever  Her strep culture came back negative  Remind me again, has she been vaccinated? I placed an order for covid test and if she can, I would like for her to complete tonight or tomorrow  Unlikely its covid though plus I know she recently was tested at work  Call tomorrow with an update and we will formulate a new plan (if needed) at that time       Juanis Flores [address]

## 2021-04-14 NOTE — PATIENT INSTRUCTIONS
Increase fluid intake as tolerated  Rest and humidification   Continue medications as directed   - Flonase OTC 1-2 sprays each nostril daily PRN post nasal drip   - Mucinex OTC to loosen secretions   - tylenol for throat pain  Return to office in one week if symptoms persist or worsen

## 2021-04-14 NOTE — PROGRESS NOTES
Assessment/Plan:    1  Acute pharyngitis, unspecified etiology  Assessment & Plan:  Symptoms began yesterday  Recommend fluids and rest - supportive care only at this time  Strep culture pending  PE wnl today in office  2  Sore throat  -     POCT rapid strepA          Patient Instructions   Increase fluid intake as tolerated  Rest and humidification   Continue medications as directed   - Flonase OTC 1-2 sprays each nostril daily PRN post nasal drip   - Mucinex OTC to loosen secretions   - tylenol for throat pain  Return to office in one week if symptoms persist or worsen          Return in about 1 week (around 4/21/2021), or if symptoms worsen or fail to improve  Subjective:      Patient ID: Liberty Dunaway is a 39 y o  female  Chief Complaint   Patient presents with    Follow-up     [facility]       Sore Throat   This is a new problem  The current episode started yesterday  The problem has been unchanged  Neither side of throat is experiencing more pain than the other  There has been no fever  The pain is at a severity of 8/10  The pain is moderate  Associated symptoms include congestion and coughing  Pertinent negatives include no abdominal pain, diarrhea, ear discharge, ear pain, headaches, hoarse voice, plugged ear sensation, shortness of breath or vomiting  She has had exposure to strep  She has tried nothing for the symptoms  The following portions of the patient's history were reviewed and updated as appropriate: allergies, current medications, past family history, past medical history, past social history, past surgical history and problem list     Review of Systems   Constitutional: Negative for chills, diaphoresis, fatigue and fever  HENT: Positive for congestion, postnasal drip and sore throat  Negative for ear discharge, ear pain, hoarse voice, rhinorrhea, sinus pressure and sinus pain  Respiratory: Positive for cough   Negative for chest tightness, shortness of breath and wheezing  Cardiovascular: Negative for chest pain  Gastrointestinal: Negative for abdominal pain, diarrhea, nausea and vomiting  Musculoskeletal: Negative for myalgias  Skin: Negative for rash  Neurological: Negative for dizziness and headaches  Hematological: Negative for adenopathy  Current Outpatient Medications   Medication Sig Dispense Refill    Prenatal Multivit-Min-Fe-FA (PRENATAL/IRON PO) Take by mouth      Doxylamine-Pyridoxine 10-10 MG TBEC       miconazole (MICOTIN) 2 % powder Apply topically 2 (two) times a day (Patient not taking: Reported on 4/13/2021) 70 g 0    nystatin (MYCOSTATIN) cream Apply topically 2 (two) times a day (Patient not taking: Reported on 3/9/2021) 30 g 0     No current facility-administered medications for this visit  Objective:    /82   Pulse 88   Temp 98 °F (36 7 °C)   Resp 16   Ht 5' (1 524 m)   Wt 88 9 kg (196 lb)   LMP 08/03/2020   SpO2 98%   BMI 38 28 kg/m²        Physical Exam  Vitals signs reviewed  Constitutional:       General: She is not in acute distress  Appearance: She is well-developed  She is not diaphoretic  HENT:      Head: Normocephalic and atraumatic  Right Ear: Tympanic membrane, ear canal and external ear normal       Left Ear: Tympanic membrane, ear canal and external ear normal       Nose: No mucosal edema or rhinorrhea  Right Sinus: No maxillary sinus tenderness or frontal sinus tenderness  Left Sinus: No maxillary sinus tenderness or frontal sinus tenderness  Mouth/Throat:      Pharynx: Oropharynx is clear  Uvula midline  No oropharyngeal exudate or posterior oropharyngeal erythema  Eyes:      General: Lids are normal          Right eye: No discharge  Left eye: No discharge  Extraocular Movements: Extraocular movements intact  Conjunctiva/sclera: Conjunctivae normal       Pupils: Pupils are equal, round, and reactive to light   Pupils are equal    Neck: Musculoskeletal: Normal range of motion and neck supple  Thyroid: No thyromegaly  Cardiovascular:      Rate and Rhythm: Normal rate and regular rhythm  Heart sounds: Normal heart sounds  Pulmonary:      Effort: Pulmonary effort is normal  No respiratory distress  Breath sounds: Normal breath sounds  No decreased breath sounds, wheezing, rhonchi or rales  Lymphadenopathy:      Cervical: No cervical adenopathy  Skin:     General: Skin is warm and dry  Findings: No rash  Neurological:      Mental Status: She is alert and oriented to person, place, and time  Psychiatric:         Behavior: Behavior normal          Thought Content:  Thought content normal                 MAYDA Sena

## 2021-04-14 NOTE — ASSESSMENT & PLAN NOTE
Symptoms began yesterday  Recommend fluids and rest - supportive care only at this time  Strep culture pending  PE wnl today in office

## 2021-04-15 LAB
BACTERIA THROAT CULT: NORMAL
SARS-COV-2 RNA RESP QL NAA+PROBE: NEGATIVE

## 2021-04-19 ENCOUNTER — TELEPHONE (OUTPATIENT)
Dept: FAMILY MEDICINE CLINIC | Facility: CLINIC | Age: 36
End: 2021-04-19

## 2021-04-19 NOTE — TELEPHONE ENCOUNTER
She may take mucinex OTC, just the pain mucinex  Also, her symptoms have been persistent for quite some time  I think this may be bacterial and we should consider antibiotic management if she has not had any improvement since her last visit with me  Let me know  Thanks!

## 2021-04-19 NOTE — TELEPHONE ENCOUNTER
LM on beckie's cell number relaying james's message  No further action required  Verified with Naldo Penn that she wanted her to take plain mucinex not pain

## 2021-04-19 NOTE — TELEPHONE ENCOUNTER
Roberta Reji wanted to know if she can take any decongestant over the counter  She has colored phelgm, coughing and nose    She is 16 weeks pregnant

## 2021-06-22 ENCOUNTER — OFFICE VISIT (OUTPATIENT)
Dept: FAMILY MEDICINE CLINIC | Facility: CLINIC | Age: 36
End: 2021-06-22
Payer: COMMERCIAL

## 2021-06-22 VITALS
WEIGHT: 212 LBS | RESPIRATION RATE: 16 BRPM | BODY MASS INDEX: 40.02 KG/M2 | HEIGHT: 61 IN | HEART RATE: 80 BPM | TEMPERATURE: 97.9 F | DIASTOLIC BLOOD PRESSURE: 70 MMHG | SYSTOLIC BLOOD PRESSURE: 104 MMHG

## 2021-06-22 DIAGNOSIS — L02.91 ABSCESS: ICD-10-CM

## 2021-06-22 DIAGNOSIS — L73.2 HIDRADENITIS SUPPURATIVA: Primary | ICD-10-CM

## 2021-06-22 PROCEDURE — 99214 OFFICE O/P EST MOD 30 MIN: CPT | Performed by: NURSE PRACTITIONER

## 2021-06-22 PROCEDURE — 10160 PNXR ASPIR ABSC HMTMA BULLA: CPT | Performed by: NURSE PRACTITIONER

## 2021-06-22 PROCEDURE — NC001 PR NO CHARGE: Performed by: NURSE PRACTITIONER

## 2021-06-22 PROCEDURE — 1036F TOBACCO NON-USER: CPT | Performed by: NURSE PRACTITIONER

## 2021-06-22 PROCEDURE — 3008F BODY MASS INDEX DOCD: CPT | Performed by: NURSE PRACTITIONER

## 2021-06-22 RX ORDER — AMOXICILLIN AND CLAVULANATE POTASSIUM 500; 125 MG/1; MG/1
1 TABLET, FILM COATED ORAL EVERY 12 HOURS SCHEDULED
Qty: 20 TABLET | Refills: 0 | Status: SHIPPED | OUTPATIENT
Start: 2021-06-22 | End: 2021-07-02

## 2021-06-22 NOTE — PROGRESS NOTES
Assessment/Plan:    1  Hidradenitis suppurativa  -     amoxicillin-clavulanate (AUGMENTIN) 500-125 mg per tablet; Take 1 tablet by mouth every 12 (twelve) hours for 10 days  -     Incision and Drainage    2  Abscess  -     amoxicillin-clavulanate (AUGMENTIN) 500-125 mg per tablet; Take 1 tablet by mouth every 12 (twelve) hours for 10 days  -     Incision and Drainage        Recommend warm compresses to the area 3-4 times daily  Return in about 4 days (around 6/26/2021) for Recheck  Subjective:      Patient ID: Galina Block is a 39 y o  female  Chief Complaint   Patient presents with    Cyst     lt groin area - couple days - very painful    25 weeks pregnant       Lisha Koo is a 39year old female who is 25 weeks gestation who presents to the office for evaluation and management of inner thigh abscess and tenderness  Reports recurrent cysts r/t hidradenitis suppurativa  Denies fever or chills  Reports significant pain from cysts and the pressure  The following portions of the patient's history were reviewed and updated as appropriate: allergies, current medications, past family history, past medical history, past social history, past surgical history and problem list     Review of Systems   Constitutional: Negative for chills and fever  Respiratory: Negative for cough, chest tightness and shortness of breath  Cardiovascular: Negative for chest pain     Skin:        Left inner thigh 2 cysts that are painful         Current Outpatient Medications   Medication Sig Dispense Refill    Prenatal Multivit-Min-Fe-FA (PRENATAL/IRON PO) Take by mouth      amoxicillin-clavulanate (AUGMENTIN) 500-125 mg per tablet Take 1 tablet by mouth every 12 (twelve) hours for 10 days 20 tablet 0    Doxylamine-Pyridoxine 10-10 MG TBEC  (Patient not taking: Reported on 6/22/2021)      miconazole (MICOTIN) 2 % powder Apply topically 2 (two) times a day (Patient not taking: Reported on 4/13/2021) 70 g 0    nystatin (MYCOSTATIN) cream Apply topically 2 (two) times a day (Patient not taking: Reported on 3/9/2021) 30 g 0     No current facility-administered medications for this visit  Objective:    /70   Pulse 80   Temp 97 9 °F (36 6 °C) (Temporal)   Resp 16   Ht 5' 0 5" (1 537 m)   Wt 96 2 kg (212 lb)   LMP 08/03/2020   BMI 40 72 kg/m²        Physical Exam  Constitutional:       General: She is not in acute distress  Appearance: She is well-developed  She is not diaphoretic  HENT:      Head: Normocephalic and atraumatic  Eyes:      General:         Right eye: No discharge  Left eye: No discharge  Conjunctiva/sclera: Conjunctivae normal    Neck:      Thyroid: No thyromegaly  Cardiovascular:      Rate and Rhythm: Normal rate and regular rhythm  Heart sounds: Normal heart sounds  Pulmonary:      Effort: Pulmonary effort is normal  No respiratory distress  Breath sounds: Normal breath sounds  No decreased breath sounds, wheezing, rhonchi or rales  Musculoskeletal:      Cervical back: Normal range of motion and neck supple  Lymphadenopathy:      Cervical: No cervical adenopathy  Skin:     General: Skin is warm and dry  Findings: No rash  Comments: 2 cysts/abscess formations that are slightly indurated and extremely tender to light palpation   Neurological:      Mental Status: She is alert and oriented to person, place, and time  Psychiatric:         Behavior: Behavior normal          Thought Content:  Thought content normal          Judgment: Judgment normal                 MAYDA Lin

## 2021-06-22 NOTE — PROGRESS NOTES
Incision and Drainage    Date/Time: 6/22/2021 4:30 PM  Performed by: MAYDA De Guzman  Authorized by: MAYDA De Guzman   Universal Protocol:  Consent given by: patient  Patient understanding: patient states understanding of the procedure being performed  Patient identity confirmed: verbally with patient      Location:     Type:  Abscess    Location:  Lower extremity    Lower extremity location:  L leg (2 abscesses left inner thigh/inguinal area)  Pre-procedure details:     Skin preparation:  Betadine  Anesthesia (see MAR for exact dosages): Anesthesia method:  Local infiltration    Local anesthetic:  Lidocaine 1% w/o epi  Procedure details:     Complexity:  Simple    Incision types:  Stab incision    Aspiration type: puncture aspiration      Scalpel blade:  15    Approach:  Puncture    Incision depth:  Subcutaneous    Drainage:  Purulent and bloody    Drainage amount:  Copious    Wound treatment:  Wound left open    Packing materials:  None  Post-procedure details:     Patient tolerance of procedure: Tolerated well, no immediate complications  Comments:      Medial abscess had little to no drainage and was quite indurated  Lateral abscess had copious drainage  Pt tolerated procedure without issues

## 2022-04-14 ENCOUNTER — OFFICE VISIT (OUTPATIENT)
Dept: FAMILY MEDICINE CLINIC | Facility: CLINIC | Age: 37
End: 2022-04-14
Payer: COMMERCIAL

## 2022-04-14 VITALS
TEMPERATURE: 97.7 F | HEIGHT: 61 IN | SYSTOLIC BLOOD PRESSURE: 112 MMHG | BODY MASS INDEX: 37.76 KG/M2 | OXYGEN SATURATION: 98 % | RESPIRATION RATE: 16 BRPM | DIASTOLIC BLOOD PRESSURE: 79 MMHG | WEIGHT: 200 LBS | HEART RATE: 84 BPM

## 2022-04-14 DIAGNOSIS — J02.9 VIRAL PHARYNGITIS: Primary | ICD-10-CM

## 2022-04-14 DIAGNOSIS — J02.9 SORE THROAT: ICD-10-CM

## 2022-04-14 PROBLEM — O34.219 VAGINAL BIRTH AFTER CESAREAN: Status: ACTIVE | Noted: 2021-10-02

## 2022-04-14 LAB — S PYO AG THROAT QL: NEGATIVE

## 2022-04-14 PROCEDURE — 87880 STREP A ASSAY W/OPTIC: CPT | Performed by: NURSE PRACTITIONER

## 2022-04-14 PROCEDURE — 99213 OFFICE O/P EST LOW 20 MIN: CPT | Performed by: NURSE PRACTITIONER

## 2022-04-14 RX ORDER — AMOXICILLIN AND CLAVULANATE POTASSIUM 500; 125 MG/1; MG/1
1 TABLET, FILM COATED ORAL EVERY 12 HOURS SCHEDULED
Qty: 14 TABLET | Refills: 0 | Status: SHIPPED | OUTPATIENT
Start: 2022-04-14 | End: 2022-04-21

## 2022-04-14 NOTE — PROGRESS NOTES
Assessment/Plan:    1  Pharyngitis  Comments:  Symptoms x's 3 weeks, waxing and waning  WATCHFUL WAITING X'S 3-5 DAYS  Orders:  -   HOLD X'S 3-5 MORE DAYS  amoxicillin-clavulanate (AUGMENTIN) 500-125 mg per tablet; Take 1 tablet by mouth every 12 (twelve) hours for 7 days    2  Sore throat  -     POCT rapid strepA  -     Throat culture          Patient Instructions   Increase fluid intake as tolerated  Rest and humidification   Continue medications as directed   - Flonase OTC 1-2 sprays each nostril daily PRN post nasal drip   - Mucinex OTC to loosen secretions   Return to office in one week if symptoms persist or worsen          Return in about 1 week (around 4/21/2022), or if symptoms worsen or fail to improve  Subjective:      Patient ID: Dejuan Zuniga is a 40 y o  female  Chief Complaint   Patient presents with    Cold Like Symptoms     Pt c/o sore throat and post nasal drip for the past few weeks  Sore Throat   This is a recurrent problem  The current episode started 1 to 4 weeks ago (3 weeks ago)  The problem has been waxing and waning  The fever has been present for less than 1 day  The pain is moderate  Pertinent negatives include no abdominal pain, congestion, coughing, diarrhea, ear discharge, ear pain, headaches, plugged ear sensation, shortness of breath or vomiting  She has had no exposure to strep  The following portions of the patient's history were reviewed and updated as appropriate: allergies, current medications, past family history, past medical history, past social history, past surgical history and problem list     Review of Systems   Constitutional: Negative for chills, diaphoresis, fatigue and fever  HENT: Positive for sore throat  Negative for congestion, ear discharge, ear pain, postnasal drip, rhinorrhea, sinus pressure and sinus pain  Eyes: Negative for pain and discharge  Respiratory: Negative for cough, chest tightness, shortness of breath and wheezing  Cardiovascular: Negative for chest pain  Gastrointestinal: Negative for abdominal pain, diarrhea, nausea and vomiting  Genitourinary: Negative for dysuria  Musculoskeletal: Negative for myalgias  Skin: Negative for rash  Neurological: Negative for dizziness and headaches  Hematological: Negative for adenopathy  Current Outpatient Medications   Medication Sig Dispense Refill    NORETHINDRONE PO Take by mouth in the morning      Prenatal Multivit-Min-Fe-FA (PRENATAL/IRON PO) Take by mouth      amoxicillin-clavulanate (AUGMENTIN) 500-125 mg per tablet Take 1 tablet by mouth every 12 (twelve) hours for 7 days 14 tablet 0     No current facility-administered medications for this visit  Objective:    /79 (BP Location: Left arm, Patient Position: Sitting, Cuff Size: Large)   Pulse 84   Temp 97 7 °F (36 5 °C) (Temporal)   Resp 16   Ht 5' 0 5" (1 537 m)   Wt 90 7 kg (200 lb)   LMP 04/12/2022 (Exact Date)   SpO2 98%   Breastfeeding Unknown   BMI 38 42 kg/m²        Physical Exam  Vitals reviewed  Constitutional:       General: She is not in acute distress  Appearance: Normal appearance  She is well-developed  She is not diaphoretic  HENT:      Head: Normocephalic and atraumatic  Right Ear: Tympanic membrane, ear canal and external ear normal       Left Ear: Tympanic membrane, ear canal and external ear normal       Nose: No mucosal edema or rhinorrhea  Mouth/Throat:      Pharynx: Oropharynx is clear  No pharyngeal swelling or posterior oropharyngeal erythema  Eyes:      General:         Right eye: No discharge  Left eye: No discharge  Conjunctiva/sclera: Conjunctivae normal    Neck:      Thyroid: No thyromegaly  Cardiovascular:      Rate and Rhythm: Normal rate and regular rhythm  Heart sounds: Normal heart sounds  Pulmonary:      Effort: Pulmonary effort is normal  No respiratory distress  Breath sounds: Normal breath sounds   No decreased breath sounds, wheezing, rhonchi or rales  Musculoskeletal:      Cervical back: Normal range of motion and neck supple  Lymphadenopathy:      Cervical: No cervical adenopathy  Skin:     General: Skin is warm and dry  Findings: No rash  Neurological:      Mental Status: She is alert and oriented to person, place, and time  Psychiatric:         Behavior: Behavior normal          Thought Content:  Thought content normal          Judgment: Judgment normal                 MAYDA Matute

## 2022-04-15 ENCOUNTER — TELEPHONE (OUTPATIENT)
Dept: ADMINISTRATIVE | Facility: OTHER | Age: 37
End: 2022-04-15

## 2022-04-15 NOTE — TELEPHONE ENCOUNTER
Upon review of the In Basket request and the patient's chart, initial outreach has been made via fax, please see Contacts section for details     [phone]  Thank you  Madonna Warner MA

## 2022-04-15 NOTE — LETTER
Vaccination Request Form: [id number] aka SARS-CoV-2 (Cecilio Yony or Josiah Tolbert or [employer] J)      Date Requested: 04/15/22  Patient: Ayaan Lowery  Patient : 1985   Referring Provider: Celine Velazquez MD       The above patient has informed us that they have had their   most recent COVID-19 aka SARS-CoV-2 (Cecilio Yony or Josiah Tolbert or [employer]) administered at your facility  Please   complete this form and attach all corresponding documentation  Date of Vaccine(s) Given  ______________________________    Lot Number(s) _______________________________________    Manufacture(s) ______________________________________    Dose Amount (s) _____________________________________    Expiration Date(s) ____________________________________    Comments __________________________________________________________  ____________________________________________________________________  ____________________________________________________________________  ____________________________________________________________________    Administering Facility  ________________________________________________    Vaccine Administered By (print name) ___________________________________      Form Completed By (print name) _______________________________________      Signature ___________________________________________________________      These reports are needed for  compliance  Please fax this completed form and a copy of the Vaccine Document(s) to our office located at [place] [address] as soon as possible to [phone] attention [place]: Phone [phone]    We thank you for your assistance in treating our mutual patient

## 2022-04-15 NOTE — TELEPHONE ENCOUNTER
----- Message from Shama Ching sent at 4/14/2022  3:50 PM EDT -----  Regarding: care gap request - Covid booster  04/14/22 3:50 PM    Hello, our patient attached above has had Immunization(s) completed/performed  Please assist in updating the patient chart by making an External outreach to [facility] facility located in [place], [place]  The date of service is 11/11/2021      Thank you,  Shama Ching  [address]

## 2022-04-17 LAB — B-HEM STREP SPEC QL CULT: NEGATIVE

## 2022-04-18 ENCOUNTER — TELEPHONE (OUTPATIENT)
Dept: FAMILY MEDICINE CLINIC | Facility: CLINIC | Age: 37
End: 2022-04-18

## 2022-04-18 NOTE — TELEPHONE ENCOUNTER
Saw you thur and started the antibiotic that night  Her throat feels better, but still coughing up mucus and ears are still clicking  She is breast feeding, but wants to know if she should go on a steroid? Or if this needs longer time?   Please advise  [phone]

## 2022-04-18 NOTE — TELEPHONE ENCOUNTER
No I think she should give this more time and try some flonase  Safe in breastfeeding  2 sprays each nostril daily

## 2022-04-19 ENCOUNTER — VBI (OUTPATIENT)
Dept: ADMINISTRATIVE | Facility: OTHER | Age: 37
End: 2022-04-19

## 2022-04-19 NOTE — TELEPHONE ENCOUNTER
Upon review of the In Basket request we were able to locate, review, and update the patient chart as requested for Immunization(s) Covid Booster  Any additional questions or concerns should be emailed to the Practice Liaisons via [email or web address] email, please do not reply via In Basket      Thank you  Alex Farris MA

## 2022-04-21 ENCOUNTER — TELEMEDICINE (OUTPATIENT)
Dept: FAMILY MEDICINE CLINIC | Facility: CLINIC | Age: 37
End: 2022-04-21
Payer: COMMERCIAL

## 2022-04-21 VITALS — BODY MASS INDEX: 39.27 KG/M2 | WEIGHT: 200 LBS | HEIGHT: 60 IN

## 2022-04-21 DIAGNOSIS — J02.9 SORE THROAT: Primary | ICD-10-CM

## 2022-04-21 DIAGNOSIS — R22.1 THROAT SWELLING: ICD-10-CM

## 2022-04-21 PROCEDURE — 3008F BODY MASS INDEX DOCD: CPT | Performed by: NURSE PRACTITIONER

## 2022-04-21 PROCEDURE — 1036F TOBACCO NON-USER: CPT | Performed by: NURSE PRACTITIONER

## 2022-04-21 PROCEDURE — 99213 OFFICE O/P EST LOW 20 MIN: CPT | Performed by: NURSE PRACTITIONER

## 2022-04-21 RX ORDER — PREDNISONE 20 MG/1
TABLET ORAL
COMMUNITY
Start: 2022-04-21

## 2022-04-21 RX ORDER — ACETAMINOPHEN AND CODEINE PHOSPHATE 120; 12 MG/5ML; MG/5ML
1 SOLUTION ORAL DAILY
COMMUNITY
Start: 2022-04-13

## 2022-04-21 RX ORDER — CEFDINIR 300 MG/1
CAPSULE ORAL
COMMUNITY
Start: 2022-04-21

## 2022-04-21 NOTE — PROGRESS NOTES
Virtual Regular Visit    Verification of patient location:    Patient is located in the following state in which I hold an active license [place]      Assessment/Plan:    Problem List Items Addressed This Visit     None      Visit Diagnoses     Sore throat    -  Primary    Throat swelling            Pt prescribed Ceftin and prednisone at outside facility urgent care  Advise that she complete this course as directed  Call if symptoms do not improve  Frequent sore throat and throat swelling, possible recurrent tonsillitis  If symptoms persist, consult ENT  Reason for visit is   Chief Complaint   Patient presents with    THROAT FEELS SWOLLEN    Sore Throat    Virtual Regular Visit        Encounter provider Conor Camacho, [facility]    Provider located at [address] [place] [address]  [address] [place] [id number]      Recent Visits  Date Type Provider Dept   04/18/22 Telephone [address] [facility] [address] [facility]   04/14/22 Office Visit Conor Camacho, [facility]   Showing recent visits within past 7 days and meeting all other requirements  Today's Visits  Date Type Provider Dept   04/21/22 [address], [facility]   Showing today's visits and meeting all other requirements  Future Appointments  No visits were found meeting these conditions  Showing future appointments within next 150 days and meeting all other requirements       The patient was identified by name and date of birth  Tate Medrano was informed that this is a telemedicine visit and that the visit is being conducted through [address] [place] [address] [facility] and patient was informed that this is a secure, HIPAA-compliant platform  She agrees to proceed     My office door was closed  No one else was in the room  She acknowledged consent and understanding of privacy and security of the video platform   The patient has agreed to participate and understands they can discontinue the visit at any time  Patient is aware this is a billable service  Subjective    Sore Throat   This is a recurrent problem  The current episode started 1 to 4 weeks ago  The problem has been waxing and waning  There has been no fever  The pain is mild  Associated symptoms include a plugged ear sensation (improving), neck pain (tender to touch throat) and trouble swallowing (pain)  Pertinent negatives include no abdominal pain, congestion, coughing, ear discharge, ear pain, headaches, shortness of breath or stridor  Treatments tried: augmentin - mild improvement  The treatment provided mild relief  Past Medical History:   Diagnosis Date    Allergic     IBS (irritable bowel syndrome)        Past Surgical History:   Procedure Laterality Date    CERVICAL BIOPSY  W/ LOOP ELECTRODE EXCISION       SECTION         Current Outpatient Medications   Medication Sig Dispense Refill    norethindrone (MICRONOR) 0 35 MG tablet Take 1 tablet by mouth daily      Prenatal Multivit-Min-Fe-FA (PRENATAL/IRON PO) Take by mouth      amoxicillin-clavulanate (AUGMENTIN) 500-125 mg per tablet Take 1 tablet by mouth every 12 (twelve) hours for 7 days (Patient not taking: Reported on 2022 ) 14 tablet 0    cefdinir (OMNICEF) 300 mg capsule  (Patient not taking: Reported on 2022 )      NORETHINDRONE PO Take by mouth in the morning (Patient not taking: Reported on 2022 )      predniSONE 20 mg tablet  (Patient not taking: Reported on 2022 )       No current facility-administered medications for this visit  Allergies   Allergen Reactions    Clindamycin Nausea Only and GI Intolerance       Review of Systems   Constitutional: Negative for chills and fever  HENT: Positive for postnasal drip, sore throat and trouble swallowing (pain)  Negative for congestion, ear discharge, ear pain, rhinorrhea and sinus pain      Respiratory: Negative for cough, shortness of breath and stridor  Gastrointestinal: Negative for abdominal pain  Musculoskeletal: Positive for neck pain (tender to touch throat)  Neurological: Negative for headaches  Video Exam    Vitals:    04/21/22 1310   Weight: 90 7 kg (200 lb)   Height: 5' 0 05" (1 525 m)       Physical Exam  Vitals reviewed  Constitutional:       Appearance: Normal appearance  HENT:      Head: Normocephalic and atraumatic  Neurological:      Mental Status: She is alert and oriented to person, place, and time  Psychiatric:         Mood and Affect: Mood normal           I spent 15 minutes directly with the patient during this visit    VIRTUAL VISIT DISCLAIMER      Shanta Soto verbally agrees to participate in [employer]  Pt is aware that [employer] could be limited without vital signs or the ability to perform a full hands-on physical Gigi Reese understands she or the provider may request at any time to terminate the video visit and request the patient to seek care or treatment in person

## 2022-10-12 PROBLEM — J02.9 ACUTE PHARYNGITIS: Status: RESOLVED | Noted: 2021-04-13 | Resolved: 2022-10-12

## 2022-11-11 DIAGNOSIS — Z13.1 SCREENING FOR DIABETES MELLITUS: ICD-10-CM

## 2022-11-11 DIAGNOSIS — Z13.0 SCREENING FOR DEFICIENCY ANEMIA: ICD-10-CM

## 2022-11-11 DIAGNOSIS — Z13.220 SCREENING FOR LIPID DISORDERS: ICD-10-CM

## 2022-11-11 DIAGNOSIS — Z00.00 ROUTINE GENERAL MEDICAL EXAMINATION AT HEALTH CARE FACILITY: Primary | ICD-10-CM

## 2022-11-11 DIAGNOSIS — Z13.29 SCREENING FOR THYROID DISORDER: ICD-10-CM

## 2022-12-12 ENCOUNTER — OFFICE VISIT (OUTPATIENT)
Dept: FAMILY MEDICINE CLINIC | Facility: CLINIC | Age: 37
End: 2022-12-12

## 2022-12-12 VITALS
SYSTOLIC BLOOD PRESSURE: 116 MMHG | HEIGHT: 60 IN | RESPIRATION RATE: 12 BRPM | TEMPERATURE: 98.3 F | BODY MASS INDEX: 40.44 KG/M2 | WEIGHT: 206 LBS | DIASTOLIC BLOOD PRESSURE: 76 MMHG | HEART RATE: 76 BPM | OXYGEN SATURATION: 98 %

## 2022-12-12 DIAGNOSIS — Z00.00 ENCOUNTER FOR ANNUAL GENERAL MEDICAL EXAMINATION WITHOUT ABNORMAL FINDINGS IN ADULT: Primary | ICD-10-CM

## 2022-12-12 DIAGNOSIS — E78.1 HYPERTRIGLYCERIDEMIA: ICD-10-CM

## 2022-12-12 DIAGNOSIS — Z87.81 HISTORY OF FRACTURE: ICD-10-CM

## 2022-12-12 DIAGNOSIS — M79.672 LEFT FOOT PAIN: ICD-10-CM

## 2022-12-12 NOTE — PROGRESS NOTES
FAMILY [facility] HEALTH MAINTENANCE OFFICE VISIT  [facility] [address] [facility]    NAME: Diego Gambino  AGE: 40 y o  SEX: female  : 1985     DATE: 2022    Assessment and Plan     1  Encounter for annual general medical examination without abnormal findings in adult  Comments:  Age appropriate screenings and recommendations discussed  Fasting labs reviewed  2  History of fracture  -     XR foot 3+ vw left; Future; Expected date: 2022    3  Left foot pain  -     XR foot 3+ vw left; Future; Expected date: 2022    4  Hypertriglyceridemia  Assessment & Plan:  Recommend heart healthy/mediteranean style eating  Discussed nutrition and exercise in detail  Handout provided  Recheck 3 months  If no improvement, will start medication management for elevated triglycerides  Orders:  -     Lipid panel; Future; Expected date: 2023  -     Lipid panel      · Patient Counseling:   · Nutrition: Stressed importance of a well balanced diet, moderation of sodium/saturated fat, caloric balance and sufficient intake of fiber  · Exercise: Stressed the importance of regular exercise with a goal of 150 minutes per week  · Dental Health: Discussed daily flossing and brushing and regular dental visits   · Alcohol Use:  Recommended moderation of alcohol intake  · Injury Prevention: Discussed Safety Belts, Safety Helmets, and Smoke Detectors    · Immunizations reviewed: Up To Date  · Discussed benefits of:  Cervical Cancer screening and Screening labs  • BMI Counseling: Body mass index is 40 23 kg/m²  Discussed with patient's BMI with her   The BMI is above normal  Nutrition recommendations include reducing portion sizes, decreasing overall calorie intake, 3-5 servings of fruits/vegetables daily, reducing fast food intake, consuming healthier snacks, decreasing soda and/or juice intake, moderation in carbohydrate intake, increasing intake of lean protein, reducing intake of saturated fat and trans fat and reducing intake of cholesterol  Exercise recommendations include moderate aerobic physical activity for 150 minutes/week and exercising 3-5 times per week  Return in about 3 months (around 3/12/2023) for Juanis Flores 4  Chief Complaint     Chief Complaint   Patient presents with   • Annual Exam       History of Present Illness     HPI    Well Adult Physical   Patient here for a comprehensive physical exam       Diet and Physical Activity  Diet: well balanced diet  Exercise: intermittently      Depression Screen  PHQ-2/9 Depression Screening    Little interest or pleasure in doing things: 0 - not at all  Feeling down, depressed, or hopeless: 0 - not at all  PHQ-2 Score: 0  PHQ-2 Interpretation: Negative depression screen          General Health  Hearing: Normal:  bilateral  Vision: goes for regular eye exams  Dental: regular dental visits    Reproductive Health  No issues       The following portions of the patient's history were reviewed and updated as appropriate: allergies, current medications, past family history, past medical history, past social history, past surgical history and problem list     Review of Systems     Review of Systems   Constitutional: Negative for diaphoresis, fatigue and fever  HENT: Negative for ear pain and hearing loss  Eyes: Negative for pain and visual disturbance  Respiratory: Negative for chest tightness and shortness of breath  Cardiovascular: Negative for chest pain, palpitations and leg swelling  Gastrointestinal: Negative for abdominal pain, constipation and diarrhea  Genitourinary: Positive for frequency and urgency  Negative for difficulty urinating  Musculoskeletal: Positive for arthralgias (left foot pain)  Negative for myalgias  Skin: Negative for rash  Neurological: Negative for dizziness, numbness and headaches  Psychiatric/Behavioral: Negative for sleep disturbance         Past Medical History     Past Medical History:   Diagnosis Date   • Allergic    • IBS (irritable bowel syndrome)        Past Surgical History     Past Surgical History:   Procedure Laterality Date   • CERVICAL BIOPSY  W/ LOOP ELECTRODE EXCISION     •  SECTION         Social History     Social History     Socioeconomic History   • Marital status: /Civil Union     Spouse name: None   • Number of children: None   • Years of education: None   • Highest education level: None   Occupational History   • None   Tobacco Use   • Smoking status: Former     Packs/day: 0 25     Years: 4 00     Pack years: 1 00     Types: Cigarettes     Quit date:      Years since quittin 9   • Smokeless tobacco: Never   Vaping Use   • Vaping Use: Never used   Substance and Sexual Activity   • Alcohol use: Not Currently     Comment: socially   • Drug use: Never   • Sexual activity: Yes     Birth control/protection: None   Other Topics Concern   • None   Social History Narrative   • None     Social Determinants of Health     Financial Resource Strain: Not on file   Food Insecurity: Not on file   Transportation Needs: Not on file   Physical Activity: Not on file   Stress: Not on file   Social Connections: Not on file   Intimate Partner Violence: Not on file   Housing Stability: Not on file       Family History     Family History   Problem Relation Age of Onset   • Breast cancer Mother    • Crohn's disease Mother    • No Known Problems Father    • Substance Abuse Neg Hx    • Mental illness Neg Hx        Current Medications     No current outpatient medications on file       Allergies     Allergies   Allergen Reactions   • Clindamycin Nausea Only and GI Intolerance     Oral only       Objective     /76 (BP Location: Left arm, Patient Position: Sitting, Cuff Size: Large)   Pulse 76   Temp 98 3 °F (36 8 °C) (Temporal)   Resp 12   Ht 5' (1 524 m)   Wt 93 4 kg (206 lb)   LMP 2022 (Exact Date)   SpO2 98%   BMI 40 23 kg/m²      Physical Exam  Vitals reviewed  Constitutional:       General: She is not in acute distress  Appearance: Normal appearance  She is well-developed  She is not diaphoretic  HENT:      Head: Normocephalic and atraumatic  Right Ear: Tympanic membrane, ear canal and external ear normal       Left Ear: Tympanic membrane, ear canal and external ear normal    Eyes:      General: Lids are normal       Extraocular Movements: Extraocular movements intact  Conjunctiva/sclera: Conjunctivae normal       Pupils: Pupils are equal, round, and reactive to light  Funduscopic exam:     Right eye: No hemorrhage or exudate  Red reflex present  Left eye: No hemorrhage or exudate  Red reflex present  Neck:      Thyroid: No thyroid mass or thyromegaly  Vascular: No carotid bruit  Cardiovascular:      Rate and Rhythm: Normal rate and regular rhythm  Pulses: Normal pulses  Heart sounds: Normal heart sounds, S1 normal and S2 normal  No murmur heard  Pulmonary:      Effort: Pulmonary effort is normal       Breath sounds: Normal breath sounds  No decreased breath sounds, wheezing, rhonchi or rales  Abdominal:      General: Bowel sounds are normal  There is no distension  Palpations: Abdomen is soft  Tenderness: There is no abdominal tenderness  Musculoskeletal:         General: Normal range of motion  Cervical back: Full passive range of motion without pain, normal range of motion and neck supple  Right lower leg: No edema  Left lower leg: No edema  Feet:    Lymphadenopathy:      Cervical: No cervical adenopathy  Upper Body:      Right upper body: No supraclavicular adenopathy  Left upper body: No supraclavicular adenopathy  Skin:     General: Skin is warm and dry  Findings: No rash  Neurological:      General: No focal deficit present  Mental Status: She is alert and oriented to person, place, and time        Cranial Nerves: No cranial nerve deficit  Sensory: No sensory deficit  Motor: Motor function is intact  Coordination: Coordination normal       Deep Tendon Reflexes: Reflexes are normal and symmetric  Psychiatric:         Speech: Speech normal          Behavior: Behavior normal          Thought Content:  Thought content normal          Judgment: Judgment normal                Purvi Day, [address] [place] [address],[address] [place] [address]

## 2022-12-12 NOTE — ASSESSMENT & PLAN NOTE
Recommend heart healthy/mediteranean style eating  Discussed nutrition and exercise in detail  Handout provided  Recheck 3 months  If no improvement, will start medication management for elevated triglycerides

## 2023-02-27 ENCOUNTER — PATIENT MESSAGE (OUTPATIENT)
Dept: FAMILY MEDICINE CLINIC | Facility: CLINIC | Age: 38
End: 2023-02-27

## 2023-02-28 NOTE — TELEPHONE ENCOUNTER
From: Nancy Liang  To: Rolando Benjamin  Sent: 2/27/2023 11:00 AM EST  Subject: inquiry    Hello,   Back in 2018 I had some, what felt like, irregular heartbeat/arrhythmia  I did f/u with [facility] they did some tests including a monitoring and all was well  The past couple of weeks I felt it again, should I f/u with cardio or is this something we can address in our appointment on 3/13? Thank you! I can also have that paperwork faxed to you, the cardio office said to send them a request to [phone]  Thanks again

## 2023-05-12 ENCOUNTER — OFFICE VISIT (OUTPATIENT)
Dept: FAMILY MEDICINE CLINIC | Facility: CLINIC | Age: 38
End: 2023-05-12

## 2023-05-12 VITALS
BODY MASS INDEX: 39.66 KG/M2 | OXYGEN SATURATION: 99 % | WEIGHT: 202 LBS | HEART RATE: 71 BPM | RESPIRATION RATE: 12 BRPM | SYSTOLIC BLOOD PRESSURE: 120 MMHG | TEMPERATURE: 97.3 F | DIASTOLIC BLOOD PRESSURE: 80 MMHG | HEIGHT: 60 IN

## 2023-05-12 DIAGNOSIS — R03.0 ELEVATED BLOOD PRESSURE READING: Primary | ICD-10-CM

## 2023-05-12 NOTE — ASSESSMENT & PLAN NOTE
BP stable in office today  Advise pt take BP in am and pm and record findings  Discuss and review in 4 weeks  Reduce caffeine intake  Increase water intake throughout the day

## 2023-05-12 NOTE — PROGRESS NOTES
Assessment/Plan:    1  Elevated blood pressure reading  Assessment & Plan:  BP stable in office today  Advise pt take BP in am and pm and record findings  Discuss and review in 4 weeks  Reduce caffeine intake  Increase water intake throughout the day  Return in about 4 weeks (around 6/9/2023) for Recheck  Subjective:      Patient ID: Balbina Lucero is a 45 y o  female  Chief Complaint   Patient presents with   • Hypertension     Pt here for high blood pressure  Seems to spike in the afternoon after lunch  For approx one week  Anny is a 45year old female who presents to the office for evaluation and management of elevated blood pressure reading  Reports that she was at work and began to feel off  States that she took her blood pressure and it was noted to be 160/90  Pt states that her blood pressure quickly came back down to 110's/70's  Denies having chest pain or shortness of breath during the incident  Reports that she does drink about 2-3 cups of coffee in the mornings  Reports that she has a floater in her left eye  The following portions of the patient's history were reviewed and updated as appropriate: allergies, current medications, past family history, past medical history, past social history, past surgical history and problem list     Review of Systems   Constitutional: Negative for chills and fever  Eyes: Negative for visual disturbance  Left eye floater   Respiratory: Negative for chest tightness and shortness of breath  Cardiovascular: Negative for chest pain, palpitations and leg swelling  No current outpatient medications on file  No current facility-administered medications for this visit         Objective:    /80 (BP Location: Left arm, Patient Position: Sitting, Cuff Size: Large)   Pulse 71   Temp (!) 97 3 °F (36 3 °C) (Temporal)   Resp 12   Ht 5' (1 524 m)   Wt 91 6 kg (202 lb)   LMP 04/18/2023 (Exact Date)   SpO2 99% Breastfeeding No   BMI 39 45 kg/m²        Physical Exam  Vitals reviewed  Constitutional:       Appearance: Normal appearance  HENT:      Head: Normocephalic and atraumatic  Cardiovascular:      Rate and Rhythm: Normal rate and regular rhythm  Heart sounds: Normal heart sounds  Pulmonary:      Effort: Pulmonary effort is normal       Breath sounds: Normal breath sounds  Neurological:      Mental Status: She is alert and oriented to person, place, and time     Psychiatric:         Mood and Affect: Mood normal                 MAYDA Talley

## 2023-08-09 ENCOUNTER — OFFICE VISIT (OUTPATIENT)
Dept: FAMILY MEDICINE CLINIC | Facility: CLINIC | Age: 38
End: 2023-08-09
Payer: COMMERCIAL

## 2023-08-09 VITALS
BODY MASS INDEX: 39.66 KG/M2 | OXYGEN SATURATION: 99 % | DIASTOLIC BLOOD PRESSURE: 82 MMHG | HEART RATE: 90 BPM | HEIGHT: 60 IN | RESPIRATION RATE: 14 BRPM | WEIGHT: 202 LBS | SYSTOLIC BLOOD PRESSURE: 118 MMHG | TEMPERATURE: 97.3 F

## 2023-08-09 DIAGNOSIS — B37.31 YEAST VAGINITIS: ICD-10-CM

## 2023-08-09 DIAGNOSIS — F43.22 ADJUSTMENT DISORDER WITH ANXIOUS MOOD: Primary | ICD-10-CM

## 2023-08-09 DIAGNOSIS — R41.89 BRAIN FOG: ICD-10-CM

## 2023-08-09 DIAGNOSIS — F41.9 ANXIETY: ICD-10-CM

## 2023-08-09 DIAGNOSIS — R42 DIZZINESS: ICD-10-CM

## 2023-08-09 DIAGNOSIS — R03.0 ELEVATED BLOOD PRESSURE READING: ICD-10-CM

## 2023-08-09 PROCEDURE — 99214 OFFICE O/P EST MOD 30 MIN: CPT | Performed by: NURSE PRACTITIONER

## 2023-08-09 RX ORDER — ALPRAZOLAM 0.25 MG/1
0.25 TABLET ORAL DAILY PRN
Qty: 30 TABLET | Refills: 0 | Status: SHIPPED | OUTPATIENT
Start: 2023-08-09

## 2023-08-09 RX ORDER — FLUOXETINE 10 MG/1
10 CAPSULE ORAL DAILY
Qty: 30 CAPSULE | Refills: 0 | Status: SHIPPED | OUTPATIENT
Start: 2023-08-09

## 2023-08-09 RX ORDER — FLUCONAZOLE 150 MG/1
TABLET ORAL
COMMUNITY
Start: 2023-08-08

## 2023-08-09 RX ORDER — PERMETHRIN 50 MG/G
CREAM TOPICAL
COMMUNITY
Start: 2023-07-28 | End: 2023-08-09 | Stop reason: HOSPADM

## 2023-08-09 RX ORDER — FLUCONAZOLE 150 MG/1
150 TABLET ORAL ONCE
Qty: 1 TABLET | Refills: 0 | Status: SHIPPED | OUTPATIENT
Start: 2023-08-09 | End: 2023-08-09

## 2023-08-09 NOTE — PROGRESS NOTES
Assessment/Plan:    1. Adjustment disorder with anxious mood  -     FLUoxetine (PROzac) 10 mg capsule; Take 1 capsule (10 mg total) by mouth daily  -     ALPRAZolam (XANAX) 0.25 mg tablet; Take 1 tablet (0.25 mg total) by mouth daily as needed for anxiety or sleep    2. Anxiety  -     ALPRAZolam (XANAX) 0.25 mg tablet; Take 1 tablet (0.25 mg total) by mouth daily as needed for anxiety or sleep    3. Elevated blood pressure reading  -     CBC and differential; Future  -     Lipid panel; Future  -     Sedimentation rate, automated; Future  -     C-reactive protein; Future  -     Comprehensive metabolic panel; Future  -     TSH, 3rd generation; Future  -     T4, free; Future  -     CBC and differential  -     POCT ECG  -     Stress test only, exercise; Future; Expected date: 08/09/2023    4. Brain fog  -     CBC and differential; Future  -     Lipid panel; Future  -     Sedimentation rate, automated; Future  -     C-reactive protein; Future  -     Comprehensive metabolic panel; Future  -     TSH, 3rd generation; Future  -     T4, free; Future  -     POCT ECG    5. Dizziness  -     CBC and differential; Future  -     Lipid panel; Future  -     Sedimentation rate, automated; Future  -     C-reactive protein; Future  -     Comprehensive metabolic panel; Future  -     TSH, 3rd generation; Future  -     T4, free; Future  -     POCT ECG    6. Yeast vaginitis  -     fluconazole (DIFLUCAN) 150 mg tablet; Take 1 tablet (150 mg total) by mouth once for 1 dose            Return in about 2 weeks (around 8/23/2023) for Med Check. Subjective:      Patient ID: Humphrey Moura is a 45 y.o. female. Chief Complaint   Patient presents with   • Follow-up     Pt here for Brain Fog/little shakey/bp seems higher than it should be  Woke up yesterday with yeast infection, telehealth got meds, still bothering       Huber Jacobo is a 45year old female who presents to the office for evaluation and management of elevated blood pressure.  Reports her blood pressure becomes elevated around 2pm every day at work. During this time, she reports feeling brain fog, dizziness and shakiness. Reports that this has been ongoing for almost one year. Reports her job is very stressful but she has recently received some help at work and she feels improvement in stress at work overall. The following portions of the patient's history were reviewed and updated as appropriate: allergies, current medications, past family history, past medical history, past social history, past surgical history and problem list.    Review of Systems   Constitutional: Negative for chills, fatigue and fever. Respiratory: Negative for cough, chest tightness and shortness of breath. Cardiovascular: Negative for chest pain, palpitations and leg swelling. Neurological: Negative for dizziness, seizures, syncope, speech difficulty, light-headedness, numbness and headaches. Current Outpatient Medications   Medication Sig Dispense Refill   • ALPRAZolam (XANAX) 0.25 mg tablet Take 1 tablet (0.25 mg total) by mouth daily as needed for anxiety or sleep 30 tablet 0   • fluconazole (DIFLUCAN) 150 mg tablet      • FLUoxetine (PROzac) 10 mg capsule Take 1 capsule (10 mg total) by mouth daily 30 capsule 0     No current facility-administered medications for this visit. Objective:    /82 (BP Location: Left arm, Patient Position: Sitting, Cuff Size: Large)   Pulse 90   Temp (!) 97.3 °F (36.3 °C) (Temporal)   Resp 14   Ht 5' (1.524 m)   Wt 91.6 kg (202 lb)   LMP 07/28/2023 (Exact Date)   SpO2 99%   BMI 39.45 kg/m²        Physical Exam  Vitals reviewed. Constitutional:       General: She is not in acute distress. Appearance: Normal appearance. She is well-developed. She is not diaphoretic. HENT:      Head: Normocephalic and atraumatic.       Right Ear: Tympanic membrane, ear canal and external ear normal.      Left Ear: Tympanic membrane, ear canal and external ear normal. Eyes:      General: Lids are normal.         Right eye: No discharge. Left eye: No discharge. Extraocular Movements: Extraocular movements intact. Conjunctiva/sclera: Conjunctivae normal.      Pupils: Pupils are equal, round, and reactive to light. Funduscopic exam:     Right eye: Red reflex present. Left eye: Red reflex present. Neck:      Thyroid: No thyroid mass or thyromegaly. Vascular: No carotid bruit. Cardiovascular:      Rate and Rhythm: Normal rate and regular rhythm. Pulses: Normal pulses. Heart sounds: Normal heart sounds, S1 normal and S2 normal.   Pulmonary:      Effort: Pulmonary effort is normal.      Breath sounds: Normal breath sounds. No decreased breath sounds, wheezing, rhonchi or rales. Musculoskeletal:      Cervical back: Full passive range of motion without pain, normal range of motion and neck supple. Lymphadenopathy:      Cervical: No cervical adenopathy. Skin:     General: Skin is warm and dry. Findings: No rash. Neurological:      General: No focal deficit present. Mental Status: She is alert and oriented to person, place, and time. Cranial Nerves: Cranial nerves 2-12 are intact. Motor: Motor function is intact. Coordination: Coordination is intact. Deep Tendon Reflexes: Reflexes are normal and symmetric. Psychiatric:         Behavior: Behavior normal.         Thought Content:  Thought content normal.         Judgment: Judgment normal.             MAYDA Muro

## 2023-08-10 PROCEDURE — 93000 ELECTROCARDIOGRAM COMPLETE: CPT | Performed by: NURSE PRACTITIONER

## 2023-08-15 ENCOUNTER — RA CDI HCC (OUTPATIENT)
Dept: OTHER | Facility: HOSPITAL | Age: 38
End: 2023-08-15

## 2023-08-15 NOTE — PROGRESS NOTES
[facility] coding opportunities       Chart reviewed, no opportunity found: CHART REVIEWED, NO OPPORTUNITY FOUND        Patients Insurance        Commercial Insurance: [address]

## 2023-08-25 ENCOUNTER — TELEMEDICINE (OUTPATIENT)
Dept: FAMILY MEDICINE CLINIC | Facility: CLINIC | Age: 38
End: 2023-08-25
Payer: COMMERCIAL

## 2023-08-25 VITALS — WEIGHT: 202 LBS | BODY MASS INDEX: 39.66 KG/M2 | HEIGHT: 60 IN

## 2023-08-25 DIAGNOSIS — F43.22 ADJUSTMENT DISORDER WITH ANXIOUS MOOD: ICD-10-CM

## 2023-08-25 DIAGNOSIS — I10 PRIMARY HYPERTENSION: Primary | ICD-10-CM

## 2023-08-25 PROCEDURE — 99214 OFFICE O/P EST MOD 30 MIN: CPT | Performed by: NURSE PRACTITIONER

## 2023-08-25 RX ORDER — HYDROCHLOROTHIAZIDE 12.5 MG/1
12.5 TABLET ORAL DAILY
Qty: 30 TABLET | Refills: 0 | Status: SHIPPED | OUTPATIENT
Start: 2023-08-25 | End: 2024-08-19

## 2023-08-28 NOTE — PROGRESS NOTES
Virtual Regular Visit    Verification of patient location:    Patient is located at Home in the following state in which I hold an active license [place]      Assessment/Plan:    Problem List Items Addressed This Visit    None  Visit Diagnoses     Primary hypertension    -  Primary    Relevant Medications    hydrochlorothiazide (HYDRODIURIL) 12.5 mg tablet    Adjustment disorder with anxious mood        Pt has not started medication yet. continue to monitor symptoms. Reviewed results of stress test with patient. Discussed indications for management of blood pressures. Recheck 2 weeks. Monitor home BP periodically. Reason for visit is   Chief Complaint   Patient presents with   • Follow-up   • Virtual Regular Visit        Encounter provider MAYDA Edwards    Provider located at [address] [id number]      Recent Visits  Date Type Provider Dept   08/25/23 [address] [place] [address] [facility]   Showing recent visits within past 7 days and meeting all other requirements  Future Appointments  No visits were found meeting these conditions. Showing future appointments within next 150 days and meeting all other requirements       The patient was identified by name and date of birth. Karrie Rosado was informed that this is a telemedicine visit and that the visit is being conducted through the [address] [place] [address] 22 Now platform. She agrees to proceed. .  My office door was closed. No one else was in the room. She acknowledged consent and understanding of privacy and security of the video platform. The patient has agreed to participate and understands they can discontinue the visit at any time. Patient is aware this is a billable service. Subjective  Karrie Rosado is a 45 y.o. female who is scheduled for a virtual visit to discuss ongoing, periodic increase in blood pressures.  Pt reports that she continues to have some higher readings. Past Medical History:   Diagnosis Date   • Allergic    • IBS (irritable bowel syndrome)        Past Surgical History:   Procedure Laterality Date   • CERVICAL BIOPSY  W/ LOOP ELECTRODE EXCISION     •  SECTION         Current Outpatient Medications   Medication Sig Dispense Refill   • ALPRAZolam (XANAX) 0.25 mg tablet Take 1 tablet (0.25 mg total) by mouth daily as needed for anxiety or sleep 30 tablet 0   • fluconazole (DIFLUCAN) 150 mg tablet      • FLUoxetine (PROzac) 10 mg capsule Take 1 capsule (10 mg total) by mouth daily 30 capsule 0   • hydrochlorothiazide (HYDRODIURIL) 12.5 mg tablet Take 1 tablet (12.5 mg total) by mouth daily 30 tablet 0     No current facility-administered medications for this visit. Allergies   Allergen Reactions   • Clindamycin Nausea Only and GI Intolerance     Oral only       Review of Systems   Constitutional: Negative for chills, fatigue and fever. Respiratory: Negative for cough, chest tightness and shortness of breath. Cardiovascular: Negative for chest pain. Video Exam    Vitals:    23 1532   Weight: 91.6 kg (202 lb)   Height: 5' (1.524 m)       Physical Exam  Vitals reviewed. Constitutional:       Appearance: Normal appearance. HENT:      Head: Normocephalic and atraumatic. Neurological:      Mental Status: She is alert and oriented to person, place, and time.    Psychiatric:         Mood and Affect: Mood normal.          Visit Time  Total Visit Duration: 20 minutes

## 2023-09-05 ENCOUNTER — TELEPHONE (OUTPATIENT)
Dept: FAMILY MEDICINE CLINIC | Facility: CLINIC | Age: 38
End: 2023-09-05

## 2023-09-05 NOTE — TELEPHONE ENCOUNTER
Pt should see improvement in BP with on consistent use of BP med over the next 4-5 days. I recommend she complete labs as well, we will check thyroid among other things. Call on Friday with an update.

## 2023-09-05 NOTE — TELEPHONE ENCOUNTER
Jolanta Ammon called with an update. She states she took her bp before taking her bp med this morning and it was high at 146/96. Then took the med at [facility] and took her bp again at [address] [facility] states her bp was still the same. Jolanta Abraham states she does have a  sl headache since last night, but thought it was from being out in the sun yesterday. Jolanta Rosario states she doesn't want to miss anything. She asked how long do you think she will see results after taking her bp pill?   [phone]

## 2023-09-08 ENCOUNTER — TELEPHONE (OUTPATIENT)
Dept: FAMILY MEDICINE CLINIC | Facility: CLINIC | Age: 38
End: 2023-09-08

## 2023-09-08 NOTE — TELEPHONE ENCOUNTER
You wanted her to call to give an update on her BP    BP on Tuesday was 140s/90s    Started the medication on Tuesday. Today she noticed a difference 122/84 and 126/86    She will call on Monday with more reading.

## 2023-09-13 DIAGNOSIS — E78.1 HYPERTRIGLYCERIDEMIA: Primary | ICD-10-CM

## 2023-10-02 DIAGNOSIS — I10 PRIMARY HYPERTENSION: ICD-10-CM

## 2023-10-02 RX ORDER — HYDROCHLOROTHIAZIDE 12.5 MG/1
12.5 TABLET ORAL DAILY
Qty: 30 TABLET | Refills: 0 | Status: SHIPPED | OUTPATIENT
Start: 2023-10-02 | End: 2024-09-26

## 2023-10-02 NOTE — TELEPHONE ENCOUNTER
Reason for call:   [x] Refill   [] Prior Auth  [] Other:     Office:   [x] PCP/Provider -   [] Speciality/Provider -     Medication: Hydrochlorothiazide 12.5  Mg   Dose/Frequency: Take 1 tablet (12.5 mg) by mouth daily. Quantity: 30    Pharmacy: [address]    Does the patient have enough for 3 days? [] Yes   [x] No - Send as HP to POD    Patient notes her BP is trending down, requesting another 30 days.

## 2023-10-03 NOTE — TELEPHONE ENCOUNTER
Pt called in stating she works for [employer] and this medication has to be filled in house.  She's requesting it be sent to:    [employer]  [address] [place] [address], [address] [place] [address]  Ph: [phone]  Fax: [phone]

## 2023-11-06 DIAGNOSIS — I10 PRIMARY HYPERTENSION: ICD-10-CM

## 2023-11-06 RX ORDER — HYDROCHLOROTHIAZIDE 12.5 MG/1
12.5 TABLET ORAL DAILY
Qty: 90 TABLET | Refills: 1 | Status: SHIPPED | OUTPATIENT
Start: 2023-11-06 | End: 2024-05-04

## 2023-11-06 NOTE — TELEPHONE ENCOUNTER
[facility] is preferred pharmacy. It has been updated in her chart. Reason for call:   [x] Refill   [] Prior Auth  [] Other:     Office:   [x] PCP/Provider - Cristiana   [] Specialty/Provider -     Medication: hydrochlorothiazide (HYDRODIURIL) 12.5 mg tablet     Dose/Frequency: Take 1 tablet (12.5 mg total) by mouth daily     Quantity: 90    Pharmacy: [facility]    Does the patient have enough for 3 days?    [] Yes   [x] No - Send as HP to POD

## 2023-11-28 ENCOUNTER — PATIENT MESSAGE (OUTPATIENT)
Dept: FAMILY MEDICINE CLINIC | Facility: CLINIC | Age: 38
End: 2023-11-28

## 2023-11-28 DIAGNOSIS — N39.3 STRESS INCONTINENCE OF URINE: Primary | ICD-10-CM

## 2024-04-08 ENCOUNTER — TELEPHONE (OUTPATIENT)
Dept: FAMILY MEDICINE CLINIC | Facility: CLINIC | Age: 39
End: 2024-04-08

## 2024-04-08 DIAGNOSIS — Z13.220 SCREENING FOR LIPID DISORDERS: ICD-10-CM

## 2024-04-08 DIAGNOSIS — Z13.1 SCREENING FOR DIABETES MELLITUS: ICD-10-CM

## 2024-04-08 DIAGNOSIS — Z13.29 SCREENING FOR THYROID DISORDER: ICD-10-CM

## 2024-04-08 DIAGNOSIS — Z13.0 SCREENING FOR DEFICIENCY ANEMIA: Primary | ICD-10-CM

## 2024-11-01 ENCOUNTER — TELEPHONE (OUTPATIENT)
Age: 39
End: 2024-11-01

## 2024-11-01 DIAGNOSIS — I10 PRIMARY HYPERTENSION: ICD-10-CM

## 2024-11-01 DIAGNOSIS — Z13.220 SCREENING FOR LIPID DISORDERS: ICD-10-CM

## 2024-11-01 DIAGNOSIS — Z13.29 SCREENING FOR THYROID DISORDER: ICD-10-CM

## 2024-11-01 DIAGNOSIS — E78.1 HYPERTRIGLYCERIDEMIA: ICD-10-CM

## 2024-11-01 DIAGNOSIS — Z00.00 ENCOUNTER FOR ANNUAL GENERAL MEDICAL EXAMINATION WITHOUT ABNORMAL FINDINGS IN ADULT: Primary | ICD-10-CM

## 2024-11-01 NOTE — TELEPHONE ENCOUNTER
Pt called in stating she has an upcoming physical and would like her lab orders to be uploaded in a message to her on [email or web address].

## 2025-04-29 ENCOUNTER — OFFICE VISIT (OUTPATIENT)
Dept: FAMILY MEDICINE CLINIC | Facility: CLINIC | Age: 40
End: 2025-04-29
Payer: COMMERCIAL

## 2025-04-29 VITALS
RESPIRATION RATE: 16 BRPM | HEART RATE: 80 BPM | TEMPERATURE: 98.3 F | WEIGHT: 200 LBS | DIASTOLIC BLOOD PRESSURE: 80 MMHG | OXYGEN SATURATION: 99 % | SYSTOLIC BLOOD PRESSURE: 130 MMHG | HEIGHT: 62 IN | BODY MASS INDEX: 36.8 KG/M2

## 2025-04-29 DIAGNOSIS — R03.0 ELEVATED BLOOD PRESSURE READING: ICD-10-CM

## 2025-04-29 DIAGNOSIS — Z00.00 ANNUAL PHYSICAL EXAM: Primary | ICD-10-CM

## 2025-04-29 PROCEDURE — 99396 PREV VISIT EST AGE 40-64: CPT | Performed by: STUDENT IN AN ORGANIZED HEALTH CARE EDUCATION/TRAINING PROGRAM

## 2025-04-29 RX ORDER — DOXYCYCLINE HYCLATE 50 MG/1
50 CAPSULE ORAL
COMMUNITY
Start: 2025-03-10

## 2025-04-29 NOTE — PROGRESS NOTES
Adult Annual Physical  Name: Gianna Monroe      : 1985      MRN: [id number]  Encounter Provider: Maria Alejandra Tolliver MD  Encounter Date: 2025   Encounter department: [facility]    :  Assessment & Plan  Annual physical exam         Elevated blood pressure reading    Orders:  •  Echo complete w/ contrast if indicated; Future        Preventive Screenings:  - Diabetes Screening: screening not indicated  - Cholesterol Screening: screening not indicated and has hyperlipidemia   - Cervical cancer screening: risks/benefits discussed and screening up-to-date   - Breast cancer screening: screening up-to-date   - Colon cancer screening: screening not indicated   - Lung cancer screening: screening not indicated     Counseling/Anticipatory Guidance:  - Alcohol: discussed moderation in alcohol intake and recommendations for healthy alcohol use.   - Diet: discussed recommendations for a healthy/well-balanced diet.   - Exercise: the importance of regular exercise/physical activity was discussed. Recommend exercise 3-5 times per week for at least 30 minutes.       Depression Screening and Follow-up Plan: Patient was screened for depression during today's encounter. They screened negative with a PHQ-2 score of 0.          History of Present Illness     Adult Annual Physical:  Patient presents for annual physical. Patient follows with cardiology and weight management. She notes she has had elevated BP readings in the past. .     Diet and Physical Activity:  - Diet/Nutrition: no special diet.  - Exercise: walking and 3-4 times a week on average.    Depression Screening:  - PHQ-2 Score: 0    General Health:  - Sleep: sleeps poorly and 4-6 hours of sleep on average.  - Hearing: normal hearing bilateral ears.  - Vision: no vision problems and most recent eye exam < 1 year ago.  - Dental: regular dental visits.    /GYN Health:  - Follows with GYN: yes.   - Last menstrual cycle: 2025.   - History  "of STDs: yes    Advanced Care Planning:  - Has an advanced directive?: yes    - Has a durable medical POA?: yes    - ACP document given to patient?: no      Review of Systems   Constitutional:  Negative for activity change, chills, diaphoresis, fatigue and fever.   HENT:  Negative for congestion, postnasal drip, rhinorrhea and sore throat.    Respiratory:  Negative for cough, shortness of breath and wheezing.    Cardiovascular:  Negative for chest pain, palpitations and leg swelling.   Gastrointestinal:  Negative for abdominal pain, constipation, diarrhea, nausea and vomiting.   Musculoskeletal:  Negative for myalgias.   Skin:  Negative for rash.   Neurological:  Negative for weakness, light-headedness and headaches.   Psychiatric/Behavioral:  The patient is not nervous/anxious.          Objective   /80   Pulse 80   Temp 98.3 °F (36.8 °C)   Resp 16   Ht 5' 1.5\" (1.562 m)   Wt 90.7 kg (200 lb)   LMP 04/22/2025 (Exact Date)   SpO2 99%   BMI 37.18 kg/m²     Physical Exam  Vitals and nursing note reviewed.   Constitutional:       General: She is not in acute distress.     Appearance: Normal appearance. She is well-developed.   HENT:      Head: Normocephalic and atraumatic.   Eyes:      Conjunctiva/sclera: Conjunctivae normal.   Cardiovascular:      Rate and Rhythm: Normal rate and regular rhythm.      Pulses: Normal pulses.      Heart sounds: Normal heart sounds. No murmur heard.  Pulmonary:      Effort: Pulmonary effort is normal. No respiratory distress.      Breath sounds: Normal breath sounds.   Abdominal:      Palpations: Abdomen is soft.      Tenderness: There is no abdominal tenderness.   Musculoskeletal:         General: No swelling.      Cervical back: Neck supple.   Skin:     General: Skin is warm and dry.      Capillary Refill: Capillary refill takes less than 2 seconds.   Neurological:      General: No focal deficit present.      Mental Status: She is alert and oriented to person, place, and " time.   Psychiatric:         Mood and Affect: Mood normal.         Behavior: Behavior normal.         Thought Content: Thought content normal.         Judgment: Judgment normal.

## 2025-04-29 NOTE — PATIENT INSTRUCTIONS
"Red yeast extract and or omega 3 supplement        Patient Education     Routine physical for adults   The Basics   Written by the doctors and editors at [facility]   What is a physical? -- A physical is a routine visit, or \"check-up,\" with your doctor. You might also hear it called a \"wellness visit\" or \"preventive visit.\"  During each visit, the doctor will:   Ask about your physical and mental health   Ask about your habits, behaviors, and lifestyle   Do an exam   Give you vaccines if needed   Talk to you about any medicines you take   Give advice about your health   Answer your questions  Getting regular check-ups is an important part of taking care of your health. It can help your doctor find and treat any problems you have. But it's also important for preventing health problems.  A routine physical is different from a \"sick visit.\" A sick visit is when you see a doctor because of a health concern or problem. Since physicals are scheduled ahead of time, you can think about what you want to ask the doctor.  How often should I get a physical? -- It depends on your age and health. In general, for people age 21 years and older:   If you are younger than 50 years, you might be able to get a physical every 3 years.   If you are 50 years or older, your doctor might recommend a physical every year.  If you have an ongoing health condition, like diabetes or high blood pressure, your doctor will probably want to see you more often.  What happens during a physical? -- In general, each visit will include:   Physical exam - The doctor or nurse will check your height, weight, heart rate, and blood pressure. They will also look at your eyes and ears. They will ask about how you are feeling and whether you have any symptoms that bother you.   Medicines - It's a good idea to bring a list of all the medicines you take to each doctor visit. Your doctor will talk to you about your medicines and answer any questions. Tell them if you " "are having any side effects that bother you. You should also tell them if you are having trouble paying for any of your medicines.   Habits and behaviors - This includes:   Your diet   Your exercise habits   Whether you smoke, drink alcohol, or use drugs   Whether you are sexually active   Whether you feel safe at home  Your doctor will talk to you about things you can do to improve your health and lower your risk of health problems. They will also offer help and support. For example, if you want to quit smoking, they can give you advice and might prescribe medicines. If you want to improve your diet or get more physical activity, they can help you with this, too.   Lab tests, if needed - The tests you get will depend on your age and situation. For example, your doctor might want to check your:   Cholesterol   Blood sugar   Iron level   Vaccines - The recommended vaccines will depend on your age, health, and what vaccines you already had. Vaccines are very important because they can prevent certain serious or deadly infections.   Discussion of screening - \"Screening\" means checking for diseases or other health problems before they cause symptoms. Your doctor can recommend screening based on your age, risk, and preferences. This might include tests to check for:   Cancer, such as breast, prostate, cervical, ovarian, colorectal, prostate, lung, or skin cancer   Sexually transmitted infections, such as chlamydia and gonorrhea   Mental health conditions like depression and anxiety  Your doctor will talk to you about the different types of screening tests. They can help you decide which screenings to have. They can also explain what the results might mean.   Answering questions - The physical is a good time to ask the doctor or nurse questions about your health. If needed, they can refer you to other doctors or specialists, too.  Adults older than 65 years often need other care, too. As you get older, your doctor will " talk to you about:   How to prevent falling at home   Hearing or vision tests   Memory testing   How to take your medicines safely   Making sure that you have the help and support you need at home  All topics are updated as new evidence becomes available and our peer review process is complete.  This topic retrieved from [employer] on: May 02, 2024.  Topic 035174 Version 1.0  Release: 32.4.3 - C32.122  © 2024 UpToDate, Inc. and/or its affiliates. All rights reserved.  Consumer Information Use and Disclaimer   Disclaimer: This generalized information is a limited summary of diagnosis, treatment, and/or medication information. It is not meant to be comprehensive and should be used as a tool to help the user understand and/or assess potential diagnostic and treatment options. It does NOT include all information about conditions, treatments, medications, side effects, or risks that may apply to a specific patient. It is not intended to be medical advice or a substitute for the medical advice, diagnosis, or treatment of a health care provider based on the health care provider's examination and assessment of a patient's specific and unique circumstances. Patients must speak with a health care provider for complete information about their health, medical questions, and treatment options, including any risks or benefits regarding use of medications. This information does not endorse any treatments or medications as safe, effective, or approved for treating a specific patient. UpToDate, Inc. and its affiliates disclaim any warranty or liability relating to this information or the use thereof.The use of this information is governed by the Terms of Use, available at https://www.wolterskluwer.com/en/know/clinical-effectiveness-terms. 2024© UpToDate, Inc. and its affiliates and/or licensors. All rights reserved.  Copyright   © 2024 Up[employer]Date, Inc. and/or its affiliates. All rights reserved.

## 2025-04-30 ENCOUNTER — TELEPHONE (OUTPATIENT)
Dept: ADMINISTRATIVE | Facility: OTHER | Age: 40
End: 2025-04-30

## 2025-04-30 NOTE — LETTER
Procedure Request Form: Cervical Cancer Screening and Mammogram      Date Requested: 25  Patient: Gianna Monroe  Patient : 1985   Referring Provider: Maria Alejandra Tolliver MD        Date of Procedure ______________________________       The above patient has informed us that they have completed their   most recent Cervical Cancer Screening and Mammogram at your facility. Please complete   this form and attach all corresponding procedure reports/results.    Comments __________________________________________________________  ____________________________________________________________________  ____________________________________________________________________  ____________________________________________________________________    Facility Completing Procedure _________________________________________    Form Completed By (print name) _______________________________________      Signature __________________________________________________________      These reports are needed for  compliance.    Please fax this completed form and a copy of the procedure report to the [facility] Based Department as soon as possible via Fax [phone], jeovanny Abreu: Phone [phone]. Our office is located at [address] [place] [address].    We thank you for your assistance in treating our mutual patient.

## 2025-04-30 NOTE — LETTER
Procedure Request Form: Cervical Cancer Screening      Date Requested: 25  Patient: Gianna Monroe  Patient : 1985   Referring Provider: Maria Alejandra Tolliver MD        Date of Procedure ______________________________       The above patient has informed us that they have completed their   most recent Cervical Cancer Screening at your facility. Please complete   this form and attach all corresponding procedure reports/results.    Comments __________________________________________________________  ____________________________________________________________________  ____________________________________________________________________  ____________________________________________________________________    Facility Completing Procedure _________________________________________    Form Completed By (print name) _______________________________________      Signature __________________________________________________________      These reports are needed for  compliance.    Please fax this completed form and a copy of the procedure report to the [facility] Based Department as soon as possible via Fax [phone], jeovanny Abreu: Phone [phone]. Our office is located at [address] [place] [address].    We thank you for your assistance in treating our mutual patient.

## 2025-04-30 NOTE — TELEPHONE ENCOUNTER
----- Message from Soha BERMEO sent at 4/29/2025 12:11 PM EDT -----  Regarding: Care Gap Requst  04/29/25 12:11 PM    Hello, our patient attached above has had Mammogram and Pap Smear (HPV) aka Cervical Cancer Screening completed/performed. Please assist in updating the patient chart by making an External outreach to [facility]  facility located in [facility],  [phone]    Thank you,  Soha Huertas  [facility]

## 2025-04-30 NOTE — TELEPHONE ENCOUNTER
Upon review of the In Basket request and the patient's chart, initial outreach has been made via fax to facility. Please see Contacts section for details.     Thank you  Lois Whalen MA

## 2025-05-06 NOTE — TELEPHONE ENCOUNTER
As a follow-up, a second attempt has been made for outreach via fax to facility for Pap test. Please see Contacts section for details.      Upon review of the In Basket request we were able to note that no further action is required for Mammogram. The patient chart is up to date as a result of a previous request.      Any additional questions or concerns should be emailed to the Practice Liaisons via the appropriate education email address, please do not reply via In Basket.    Thank you  Lois Whalen MA   [facility]

## 2025-05-07 NOTE — TELEPHONE ENCOUNTER
Upon review of the In Basket request we have found as a result of outreach that the patient did not have the requested item(s) completed or the patient was not seen by the practice.     Any additional questions or concerns should be emailed to the Practice Liaisons via the appropriate education email address, please do not reply via In Basket.    Thank you  Lois Whalen MA   [facility]

## 2025-07-01 ENCOUNTER — TELEPHONE (OUTPATIENT)
Age: 40
End: 2025-07-01

## 2025-07-01 NOTE — TELEPHONE ENCOUNTER
Patient called in following up on if Echo order was faxed. Advised patient Lorena had faxed the order at 2:18 PM. No further assistance needed at this time.

## 2025-07-01 NOTE — TELEPHONE ENCOUNTER
Al with [facility] called stating he needs the echo order faxed to him in order to schedule the patient for the test. Please fax to him at [phone].    Phone number is [phone] option 6.     Thank you.

## 2025-07-02 NOTE — TELEPHONE ENCOUNTER
The patient called to report the company never received the fax and she would like the order to be fax it to her at [phone]

## 2025-07-03 ENCOUNTER — DOCUMENTATION (OUTPATIENT)
Dept: FAMILY MEDICINE CLINIC | Facility: CLINIC | Age: 40
End: 2025-07-03

## 2025-07-03 DIAGNOSIS — R03.0 ELEVATED BLOOD PRESSURE READING: Primary | ICD-10-CM

## 2025-07-03 RX ORDER — HYDROCHLOROTHIAZIDE 12.5 MG/1
12.5 TABLET ORAL DAILY
Qty: 30 TABLET | Refills: 0 | Status: SHIPPED | OUTPATIENT
Start: 2025-07-03 | End: 2025-07-03 | Stop reason: SDUPTHER

## 2025-07-03 RX ORDER — HYDROCHLOROTHIAZIDE 12.5 MG/1
12.5 TABLET ORAL DAILY
Qty: 30 TABLET | Refills: 0 | Status: SHIPPED | OUTPATIENT
Start: 2025-07-03 | End: 2025-07-07 | Stop reason: SDUPTHER

## 2025-07-07 DIAGNOSIS — R03.0 ELEVATED BLOOD PRESSURE READING: ICD-10-CM

## 2025-07-07 RX ORDER — HYDROCHLOROTHIAZIDE 12.5 MG/1
12.5 TABLET ORAL DAILY
Qty: 30 TABLET | Refills: 0 | Status: CANCELLED | OUTPATIENT
Start: 2025-07-07 | End: 2026-01-03

## 2025-07-07 RX ORDER — HYDROCHLOROTHIAZIDE 12.5 MG/1
12.5 TABLET ORAL DAILY
Qty: 30 TABLET | Refills: 0 | Status: SHIPPED | OUTPATIENT
Start: 2025-07-07 | End: 2026-01-03

## 2025-07-07 NOTE — TELEPHONE ENCOUNTER
Gianna messages late on Friday stating that [facility] told her that she was unable to use them as a pharmacy.    It has to go to   [facility]   p) [phone]   (f) [phone]

## 2025-08-04 DIAGNOSIS — R03.0 ELEVATED BLOOD PRESSURE READING: ICD-10-CM

## 2025-08-06 RX ORDER — HYDROCHLOROTHIAZIDE 12.5 MG/1
12.5 TABLET ORAL DAILY
Qty: 30 TABLET | Refills: 0 | Status: SHIPPED | OUTPATIENT
Start: 2025-08-06 | End: 2026-02-02

## 2025-08-21 ENCOUNTER — TELEPHONE (OUTPATIENT)
Age: 40
End: 2025-08-21

## 2025-08-21 DIAGNOSIS — E66.9 OBESITY (BMI 30-39.9): Primary | ICD-10-CM
